# Patient Record
Sex: MALE | Race: BLACK OR AFRICAN AMERICAN | NOT HISPANIC OR LATINO | Employment: OTHER | URBAN - METROPOLITAN AREA
[De-identification: names, ages, dates, MRNs, and addresses within clinical notes are randomized per-mention and may not be internally consistent; named-entity substitution may affect disease eponyms.]

---

## 2022-05-06 RX ORDER — INSULIN GLARGINE 100 [IU]/ML
INJECTION, SOLUTION SUBCUTANEOUS
Status: ON HOLD | COMMUNITY
Start: 2022-01-13 | End: 2023-11-08 | Stop reason: SDUPTHER

## 2022-05-06 RX ORDER — FLUOXETINE HYDROCHLORIDE 20 MG/1
CAPSULE ORAL
Status: ON HOLD | COMMUNITY
Start: 2022-01-18 | End: 2023-11-19 | Stop reason: HOSPADM

## 2022-05-06 RX ORDER — TRAZODONE HYDROCHLORIDE 50 MG/1
100 TABLET ORAL NIGHTLY
COMMUNITY
Start: 2022-04-19

## 2022-05-06 RX ORDER — INSULIN ASPART 100 [IU]/ML
INJECTION, SOLUTION INTRAVENOUS; SUBCUTANEOUS
Status: ON HOLD | COMMUNITY
Start: 2022-04-25 | End: 2023-11-19 | Stop reason: SDUPTHER

## 2022-05-06 RX ORDER — CEPHALEXIN 500 MG/1
CAPSULE ORAL
COMMUNITY
Start: 2022-03-29 | End: 2023-11-06

## 2022-05-06 RX ORDER — BRIMONIDINE TARTRATE, TIMOLOL MALEATE 2; 5 MG/ML; MG/ML
SOLUTION/ DROPS OPHTHALMIC
Status: ON HOLD | COMMUNITY
Start: 2022-02-27 | End: 2023-11-19 | Stop reason: HOSPADM

## 2022-05-06 RX ORDER — METFORMIN HYDROCHLORIDE 1000 MG/1
1000 TABLET ORAL 2 TIMES DAILY WITH MEALS
Status: ON HOLD | COMMUNITY
Start: 2022-04-19 | End: 2023-11-19 | Stop reason: HOSPADM

## 2022-05-06 RX ORDER — SEMAGLUTIDE 1.34 MG/ML
INJECTION, SOLUTION SUBCUTANEOUS
Status: ON HOLD | COMMUNITY
Start: 2022-03-29 | End: 2023-11-19 | Stop reason: HOSPADM

## 2022-05-06 RX ORDER — CARVEDILOL 6.25 MG/1
6.25 TABLET ORAL 2 TIMES DAILY WITH MEALS
Status: ON HOLD | COMMUNITY
Start: 2021-11-10 | End: 2023-11-19 | Stop reason: HOSPADM

## 2022-05-06 RX ORDER — DORZOLAMIDE HCL 20 MG/ML
SOLUTION/ DROPS OPHTHALMIC
COMMUNITY
Start: 2022-04-13

## 2022-05-06 RX ORDER — ROSUVASTATIN CALCIUM 40 MG/1
40 TABLET, COATED ORAL DAILY
COMMUNITY
Start: 2022-04-19

## 2022-05-06 RX ORDER — LATANOPROST 50 UG/ML
SOLUTION/ DROPS OPHTHALMIC
COMMUNITY
Start: 2022-05-02

## 2022-05-06 RX ORDER — GLYCOPYRROLATE 1 MG/1
1 TABLET ORAL 2 TIMES DAILY
Status: ON HOLD | COMMUNITY
Start: 2022-01-22 | End: 2023-11-19 | Stop reason: HOSPADM

## 2022-05-06 RX ORDER — LISINOPRIL 40 MG/1
40 TABLET ORAL DAILY
Status: ON HOLD | COMMUNITY
Start: 2022-04-19 | End: 2023-11-19 | Stop reason: HOSPADM

## 2022-05-06 RX ORDER — AMLODIPINE BESYLATE 10 MG/1
10 TABLET ORAL DAILY
Status: ON HOLD | COMMUNITY
Start: 2022-04-13 | End: 2023-11-06

## 2022-05-06 NOTE — TELEPHONE ENCOUNTER
Unable to verify patient pharmacy, called patient, no answer, left message for patient to call clinic to verify pharmacy information

## 2023-11-06 ENCOUNTER — HOSPITAL ENCOUNTER (OUTPATIENT)
Facility: OTHER | Age: 58
Discharge: HOME | End: 2023-11-08
Attending: EMERGENCY MEDICINE | Admitting: INTERNAL MEDICINE
Payer: COMMERCIAL

## 2023-11-06 DIAGNOSIS — R73.9 HYPERGLYCEMIA: ICD-10-CM

## 2023-11-06 DIAGNOSIS — E11.65 TYPE 2 DIABETES MELLITUS WITH HYPERGLYCEMIA, WITH LONG-TERM CURRENT USE OF INSULIN: ICD-10-CM

## 2023-11-06 DIAGNOSIS — Z79.4 TYPE 2 DIABETES MELLITUS WITH HYPERGLYCEMIA, WITH LONG-TERM CURRENT USE OF INSULIN: ICD-10-CM

## 2023-11-06 DIAGNOSIS — N17.9 ACUTE KIDNEY INJURY: Primary | ICD-10-CM

## 2023-11-06 DIAGNOSIS — R07.9 CHEST PAIN: ICD-10-CM

## 2023-11-06 DIAGNOSIS — R06.02 SOB (SHORTNESS OF BREATH): ICD-10-CM

## 2023-11-06 PROBLEM — F32.A DEPRESSION: Status: ACTIVE | Noted: 2023-07-05

## 2023-11-06 PROBLEM — F43.23 ADJUSTMENT DISORDER WITH MIXED ANXIETY AND DEPRESSED MOOD: Status: ACTIVE | Noted: 2018-12-13

## 2023-11-06 PROBLEM — E11.42 DIABETIC PERIPHERAL NEUROPATHY ASSOCIATED WITH TYPE 2 DIABETES MELLITUS: Status: ACTIVE | Noted: 2022-06-06

## 2023-11-06 PROBLEM — Z89.511 ACQUIRED ABSENCE OF RIGHT LEG BELOW KNEE: Status: ACTIVE | Noted: 2022-01-13

## 2023-11-06 PROBLEM — E11.3292 MILD NONPROLIFERATIVE DIABETIC RETINOPATHY OF LEFT EYE WITHOUT MACULAR EDEMA ASSOCIATED WITH TYPE 2 DIABETES MELLITUS: Status: ACTIVE | Noted: 2021-12-17

## 2023-11-06 PROBLEM — I50.42 CHRONIC COMBINED SYSTOLIC AND DIASTOLIC HEART FAILURE: Status: ACTIVE | Noted: 2023-11-06

## 2023-11-06 PROBLEM — D50.9 MICROCYTIC ANEMIA: Status: ACTIVE | Noted: 2023-07-05

## 2023-11-06 PROBLEM — D50.0 IRON DEFICIENCY ANEMIA DUE TO CHRONIC BLOOD LOSS: Status: ACTIVE | Noted: 2023-11-06

## 2023-11-06 PROBLEM — F33.0 MAJOR DEPRESSIVE DISORDER, RECURRENT EPISODE, MILD: Status: ACTIVE | Noted: 2023-04-04

## 2023-11-06 LAB
ALBUMIN SERPL BCP-MCNC: 3 G/DL (ref 3.5–5.2)
ALP SERPL-CCNC: 97 U/L (ref 55–135)
ALT SERPL W/O P-5'-P-CCNC: 27 U/L (ref 10–44)
ANION GAP SERPL CALC-SCNC: 13 MMOL/L (ref 8–16)
AST SERPL-CCNC: 12 U/L (ref 10–40)
B-OH-BUTYR BLD STRIP-SCNC: 0.2 MMOL/L (ref 0–0.5)
BACTERIA #/AREA URNS HPF: ABNORMAL /HPF
BASOPHILS # BLD AUTO: 0.02 K/UL (ref 0–0.2)
BASOPHILS NFR BLD: 0.2 % (ref 0–1.9)
BILIRUB SERPL-MCNC: 0.4 MG/DL (ref 0.1–1)
BILIRUB UR QL STRIP: NEGATIVE
BUN SERPL-MCNC: 39 MG/DL (ref 6–20)
CALCIUM SERPL-MCNC: 9.7 MG/DL (ref 8.7–10.5)
CHLORIDE SERPL-SCNC: 98 MMOL/L (ref 95–110)
CLARITY UR: CLEAR
CO2 SERPL-SCNC: 19 MMOL/L (ref 23–29)
COLOR UR: YELLOW
CREAT SERPL-MCNC: 2.2 MG/DL (ref 0.5–1.4)
DIFFERENTIAL METHOD: ABNORMAL
EOSINOPHIL # BLD AUTO: 0 K/UL (ref 0–0.5)
EOSINOPHIL NFR BLD: 0.2 % (ref 0–8)
ERYTHROCYTE [DISTWIDTH] IN BLOOD BY AUTOMATED COUNT: 21.2 % (ref 11.5–14.5)
EST. GFR  (NO RACE VARIABLE): 34 ML/MIN/1.73 M^2
FIO2: 21
GLUCOSE SERPL-MCNC: 642 MG/DL (ref 70–110)
GLUCOSE UR QL STRIP: ABNORMAL
HCO3 UR-SCNC: 20.8 MMOL/L (ref 24–28)
HCT VFR BLD AUTO: 33.2 % (ref 40–54)
HCT VFR BLD CALC: 33 %PCV (ref 36–54)
HGB BLD-MCNC: 10.6 G/DL (ref 14–18)
HGB BLD-MCNC: 11 G/DL
HGB UR QL STRIP: NEGATIVE
IMM GRANULOCYTES # BLD AUTO: 0.13 K/UL (ref 0–0.04)
IMM GRANULOCYTES NFR BLD AUTO: 1.3 % (ref 0–0.5)
KETONES UR QL STRIP: NEGATIVE
LEUKOCYTE ESTERASE UR QL STRIP: ABNORMAL
LYMPHOCYTES # BLD AUTO: 1.2 K/UL (ref 1–4.8)
LYMPHOCYTES NFR BLD: 12.6 % (ref 18–48)
MCH RBC QN AUTO: 24.7 PG (ref 27–31)
MCHC RBC AUTO-ENTMCNC: 31.9 G/DL (ref 32–36)
MCV RBC AUTO: 77 FL (ref 82–98)
MICROSCOPIC COMMENT: ABNORMAL
MONOCYTES # BLD AUTO: 1 K/UL (ref 0.3–1)
MONOCYTES NFR BLD: 9.7 % (ref 4–15)
NEUTROPHILS # BLD AUTO: 7.5 K/UL (ref 1.8–7.7)
NEUTROPHILS NFR BLD: 76 % (ref 38–73)
NITRITE UR QL STRIP: NEGATIVE
NRBC BLD-RTO: 0 /100 WBC
PCO2 BLDA: 39.3 MMHG (ref 35–45)
PH SMN: 7.33 [PH] (ref 7.35–7.45)
PH UR STRIP: 6 [PH] (ref 5–8)
PLATELET # BLD AUTO: 261 K/UL (ref 150–450)
PMV BLD AUTO: 9.8 FL (ref 9.2–12.9)
PO2 BLDA: 23 MMHG (ref 40–60)
POC BE: -5 MMOL/L
POC SATURATED O2: 35 % (ref 95–100)
POC TCO2: 22 MMOL/L (ref 24–29)
POCT GLUCOSE: 278 MG/DL (ref 70–110)
POCT GLUCOSE: 301 MG/DL (ref 70–110)
POCT GLUCOSE: >500 MG/DL (ref 70–110)
POCT GLUCOSE: >500 MG/DL (ref 70–110)
POTASSIUM SERPL-SCNC: 5 MMOL/L (ref 3.5–5.1)
PROT SERPL-MCNC: 7.3 G/DL (ref 6–8.4)
PROT UR QL STRIP: ABNORMAL
RBC # BLD AUTO: 4.3 M/UL (ref 4.6–6.2)
RBC #/AREA URNS HPF: 1 /HPF (ref 0–4)
SAMPLE: ABNORMAL
SODIUM SERPL-SCNC: 130 MMOL/L (ref 136–145)
SP GR UR STRIP: 1.02 (ref 1–1.03)
SQUAMOUS #/AREA URNS HPF: 0 /HPF
URN SPEC COLLECT METH UR: ABNORMAL
UROBILINOGEN UR STRIP-ACNC: NEGATIVE EU/DL
WBC # BLD AUTO: 9.87 K/UL (ref 3.9–12.7)
WBC #/AREA URNS HPF: 43 /HPF (ref 0–5)
YEAST URNS QL MICRO: ABNORMAL

## 2023-11-06 PROCEDURE — 93010 ELECTROCARDIOGRAM REPORT: CPT | Mod: ,,, | Performed by: INTERNAL MEDICINE

## 2023-11-06 PROCEDURE — 82803 BLOOD GASES ANY COMBINATION: CPT

## 2023-11-06 PROCEDURE — 99900035 HC TECH TIME PER 15 MIN (STAT)

## 2023-11-06 PROCEDURE — G0378 HOSPITAL OBSERVATION PER HR: HCPCS

## 2023-11-06 PROCEDURE — 96372 THER/PROPH/DIAG INJ SC/IM: CPT | Performed by: PHYSICIAN ASSISTANT

## 2023-11-06 PROCEDURE — 82010 KETONE BODYS QUAN: CPT | Performed by: EMERGENCY MEDICINE

## 2023-11-06 PROCEDURE — 25000003 PHARM REV CODE 250: Performed by: EMERGENCY MEDICINE

## 2023-11-06 PROCEDURE — 87077 CULTURE AEROBIC IDENTIFY: CPT | Performed by: NURSE PRACTITIONER

## 2023-11-06 PROCEDURE — 82962 GLUCOSE BLOOD TEST: CPT

## 2023-11-06 PROCEDURE — 87086 URINE CULTURE/COLONY COUNT: CPT | Performed by: NURSE PRACTITIONER

## 2023-11-06 PROCEDURE — 96374 THER/PROPH/DIAG INJ IV PUSH: CPT

## 2023-11-06 PROCEDURE — 87186 SC STD MICRODIL/AGAR DIL: CPT | Performed by: NURSE PRACTITIONER

## 2023-11-06 PROCEDURE — 85025 COMPLETE CBC W/AUTO DIFF WBC: CPT | Performed by: NURSE PRACTITIONER

## 2023-11-06 PROCEDURE — 93010 EKG 12-LEAD: ICD-10-PCS | Mod: ,,, | Performed by: INTERNAL MEDICINE

## 2023-11-06 PROCEDURE — 80053 COMPREHEN METABOLIC PANEL: CPT | Performed by: NURSE PRACTITIONER

## 2023-11-06 PROCEDURE — 63600175 PHARM REV CODE 636 W HCPCS: Performed by: EMERGENCY MEDICINE

## 2023-11-06 PROCEDURE — 87088 URINE BACTERIA CULTURE: CPT | Performed by: NURSE PRACTITIONER

## 2023-11-06 PROCEDURE — 93005 ELECTROCARDIOGRAM TRACING: CPT

## 2023-11-06 PROCEDURE — 81000 URINALYSIS NONAUTO W/SCOPE: CPT | Performed by: NURSE PRACTITIONER

## 2023-11-06 PROCEDURE — 25000003 PHARM REV CODE 250: Performed by: PHYSICIAN ASSISTANT

## 2023-11-06 PROCEDURE — 99285 EMERGENCY DEPT VISIT HI MDM: CPT | Mod: 25

## 2023-11-06 RX ORDER — IPRATROPIUM BROMIDE AND ALBUTEROL SULFATE 2.5; .5 MG/3ML; MG/3ML
3 SOLUTION RESPIRATORY (INHALATION) EVERY 4 HOURS PRN
Status: DISCONTINUED | OUTPATIENT
Start: 2023-11-06 | End: 2023-11-08 | Stop reason: HOSPADM

## 2023-11-06 RX ORDER — NALOXONE HCL 0.4 MG/ML
0.02 VIAL (ML) INJECTION
Status: DISCONTINUED | OUTPATIENT
Start: 2023-11-06 | End: 2023-11-08 | Stop reason: HOSPADM

## 2023-11-06 RX ORDER — SPIRONOLACTONE 25 MG/1
25 TABLET ORAL DAILY
Status: ON HOLD | COMMUNITY
Start: 2023-10-30 | End: 2023-11-19 | Stop reason: HOSPADM

## 2023-11-06 RX ORDER — BLOOD-GLUCOSE CONTROL, NORMAL
1 EACH MISCELLANEOUS 3 TIMES DAILY PRN
Status: ON HOLD | COMMUNITY
Start: 2023-07-19 | End: 2023-11-19 | Stop reason: HOSPADM

## 2023-11-06 RX ORDER — IBUPROFEN 200 MG
16 TABLET ORAL
Status: DISCONTINUED | OUTPATIENT
Start: 2023-11-06 | End: 2023-11-08 | Stop reason: HOSPADM

## 2023-11-06 RX ORDER — SUCRALFATE 1 G/10ML
1 SUSPENSION ORAL EVERY 6 HOURS
Status: DISCONTINUED | OUTPATIENT
Start: 2023-11-07 | End: 2023-11-08 | Stop reason: HOSPADM

## 2023-11-06 RX ORDER — ATORVASTATIN CALCIUM 20 MG/1
80 TABLET, FILM COATED ORAL NIGHTLY
Status: DISCONTINUED | OUTPATIENT
Start: 2023-11-06 | End: 2023-11-08 | Stop reason: HOSPADM

## 2023-11-06 RX ORDER — MAG HYDROX/ALUMINUM HYD/SIMETH 200-200-20
30 SUSPENSION, ORAL (FINAL DOSE FORM) ORAL 4 TIMES DAILY PRN
Status: DISCONTINUED | OUTPATIENT
Start: 2023-11-06 | End: 2023-11-08 | Stop reason: HOSPADM

## 2023-11-06 RX ORDER — BISACODYL 10 MG
10 SUPPOSITORY, RECTAL RECTAL DAILY PRN
Status: DISCONTINUED | OUTPATIENT
Start: 2023-11-06 | End: 2023-11-08 | Stop reason: HOSPADM

## 2023-11-06 RX ORDER — TALC
6 POWDER (GRAM) TOPICAL NIGHTLY PRN
Status: DISCONTINUED | OUTPATIENT
Start: 2023-11-06 | End: 2023-11-08 | Stop reason: HOSPADM

## 2023-11-06 RX ORDER — SODIUM CHLORIDE 9 MG/ML
1000 INJECTION, SOLUTION INTRAVENOUS
Status: COMPLETED | OUTPATIENT
Start: 2023-11-06 | End: 2023-11-06

## 2023-11-06 RX ORDER — ONDANSETRON 8 MG/1
8 TABLET, ORALLY DISINTEGRATING ORAL EVERY 8 HOURS PRN
Status: DISCONTINUED | OUTPATIENT
Start: 2023-11-06 | End: 2023-11-08 | Stop reason: HOSPADM

## 2023-11-06 RX ORDER — PANTOPRAZOLE SODIUM 40 MG/1
40 TABLET, DELAYED RELEASE ORAL EVERY MORNING
COMMUNITY
Start: 2023-08-03

## 2023-11-06 RX ORDER — VALSARTAN 40 MG/1
40 TABLET ORAL 2 TIMES DAILY
Status: ON HOLD | COMMUNITY
Start: 2023-07-25 | End: 2023-11-08 | Stop reason: HOSPADM

## 2023-11-06 RX ORDER — LANOLIN ALCOHOL/MO/W.PET/CERES
1 CREAM (GRAM) TOPICAL DAILY
Status: DISCONTINUED | OUTPATIENT
Start: 2023-11-07 | End: 2023-11-08 | Stop reason: HOSPADM

## 2023-11-06 RX ORDER — FERROUS SULFATE TAB 325 MG (65 MG ELEMENTAL FE) 325 (65 FE) MG
TAB ORAL EVERY OTHER DAY
COMMUNITY
Start: 2023-08-03

## 2023-11-06 RX ORDER — MAG HYDROX/ALUMINUM HYD/SIMETH 200-200-20
30 SUSPENSION, ORAL (FINAL DOSE FORM) ORAL
Status: DISCONTINUED | OUTPATIENT
Start: 2023-11-06 | End: 2023-11-08 | Stop reason: HOSPADM

## 2023-11-06 RX ORDER — MIRTAZAPINE 15 MG/1
15 TABLET, FILM COATED ORAL NIGHTLY
COMMUNITY
Start: 2023-09-16

## 2023-11-06 RX ORDER — PROCHLORPERAZINE EDISYLATE 5 MG/ML
5 INJECTION INTRAMUSCULAR; INTRAVENOUS EVERY 6 HOURS PRN
Status: DISCONTINUED | OUTPATIENT
Start: 2023-11-06 | End: 2023-11-08 | Stop reason: HOSPADM

## 2023-11-06 RX ORDER — EMPAGLIFLOZIN 10 MG/1
10 TABLET, FILM COATED ORAL DAILY
Status: ON HOLD | COMMUNITY
Start: 2023-10-23 | End: 2023-11-19 | Stop reason: HOSPADM

## 2023-11-06 RX ORDER — SENNOSIDES 8.6 MG/1
1 TABLET ORAL DAILY
Status: DISCONTINUED | OUTPATIENT
Start: 2023-11-07 | End: 2023-11-08 | Stop reason: HOSPADM

## 2023-11-06 RX ORDER — MIRTAZAPINE 15 MG/1
15 TABLET, FILM COATED ORAL NIGHTLY
Status: DISCONTINUED | OUTPATIENT
Start: 2023-11-06 | End: 2023-11-08 | Stop reason: HOSPADM

## 2023-11-06 RX ORDER — SODIUM CHLORIDE 0.9 % (FLUSH) 0.9 %
5 SYRINGE (ML) INJECTION
Status: DISCONTINUED | OUTPATIENT
Start: 2023-11-06 | End: 2023-11-08 | Stop reason: HOSPADM

## 2023-11-06 RX ORDER — TRAZODONE HYDROCHLORIDE 100 MG/1
100 TABLET ORAL NIGHTLY
Status: DISCONTINUED | OUTPATIENT
Start: 2023-11-06 | End: 2023-11-08 | Stop reason: HOSPADM

## 2023-11-06 RX ORDER — CARVEDILOL 6.25 MG/1
6.25 TABLET ORAL 2 TIMES DAILY WITH MEALS
Status: DISCONTINUED | OUTPATIENT
Start: 2023-11-07 | End: 2023-11-08 | Stop reason: HOSPADM

## 2023-11-06 RX ORDER — ACETAMINOPHEN 500 MG
1000 TABLET ORAL EVERY 8 HOURS PRN
Status: DISCONTINUED | OUTPATIENT
Start: 2023-11-06 | End: 2023-11-08 | Stop reason: HOSPADM

## 2023-11-06 RX ORDER — FUROSEMIDE 40 MG/1
40 TABLET ORAL DAILY
Status: ON HOLD | COMMUNITY
Start: 2023-10-25 | End: 2023-11-19 | Stop reason: HOSPADM

## 2023-11-06 RX ORDER — GLUCAGON 1 MG
1 KIT INJECTION
Status: DISCONTINUED | OUTPATIENT
Start: 2023-11-06 | End: 2023-11-08 | Stop reason: HOSPADM

## 2023-11-06 RX ORDER — IBUPROFEN 200 MG
24 TABLET ORAL
Status: DISCONTINUED | OUTPATIENT
Start: 2023-11-06 | End: 2023-11-08 | Stop reason: HOSPADM

## 2023-11-06 RX ORDER — SENNOSIDES 8.6 MG/1
1 TABLET ORAL DAILY
COMMUNITY
Start: 2023-04-10 | End: 2024-04-09

## 2023-11-06 RX ORDER — SIMETHICONE 80 MG
1 TABLET,CHEWABLE ORAL 4 TIMES DAILY PRN
Status: DISCONTINUED | OUTPATIENT
Start: 2023-11-06 | End: 2023-11-08 | Stop reason: HOSPADM

## 2023-11-06 RX ORDER — ACETAMINOPHEN 325 MG/1
650 TABLET ORAL EVERY 4 HOURS PRN
Status: DISCONTINUED | OUTPATIENT
Start: 2023-11-06 | End: 2023-11-08 | Stop reason: HOSPADM

## 2023-11-06 RX ORDER — LATANOPROST 50 UG/ML
1 SOLUTION/ DROPS OPHTHALMIC NIGHTLY
Status: DISCONTINUED | OUTPATIENT
Start: 2023-11-06 | End: 2023-11-08 | Stop reason: HOSPADM

## 2023-11-06 RX ORDER — ASPIRIN 81 MG/1
81 TABLET ORAL DAILY
Status: DISCONTINUED | OUTPATIENT
Start: 2023-11-07 | End: 2023-11-08 | Stop reason: HOSPADM

## 2023-11-06 RX ORDER — INSULIN PUMP SYRINGE, 3 ML
EACH MISCELLANEOUS
Status: ON HOLD | COMMUNITY
Start: 2023-07-19 | End: 2023-11-19 | Stop reason: HOSPADM

## 2023-11-06 RX ORDER — VENLAFAXINE HYDROCHLORIDE 75 MG/1
150 CAPSULE, EXTENDED RELEASE ORAL DAILY
Status: DISCONTINUED | OUTPATIENT
Start: 2023-11-07 | End: 2023-11-08 | Stop reason: HOSPADM

## 2023-11-06 RX ORDER — VENLAFAXINE HYDROCHLORIDE 150 MG/1
150 CAPSULE, EXTENDED RELEASE ORAL DAILY
COMMUNITY
Start: 2023-10-12

## 2023-11-06 RX ORDER — ASPIRIN 81 MG/1
1 TABLET ORAL DAILY
COMMUNITY
Start: 2023-10-30 | End: 2024-01-28

## 2023-11-06 RX ORDER — INSULIN ASPART 100 [IU]/ML
5 INJECTION, SOLUTION INTRAVENOUS; SUBCUTANEOUS
Status: DISCONTINUED | OUTPATIENT
Start: 2023-11-07 | End: 2023-11-07

## 2023-11-06 RX ADMIN — LATANOPROST 1 DROP: 50 SOLUTION OPHTHALMIC at 09:11

## 2023-11-06 RX ADMIN — TRAZODONE HYDROCHLORIDE 100 MG: 100 TABLET ORAL at 10:11

## 2023-11-06 RX ADMIN — SODIUM CHLORIDE 1000 ML: 0.9 INJECTION, SOLUTION INTRAVENOUS at 05:11

## 2023-11-06 RX ADMIN — INSULIN HUMAN 6 UNITS: 100 INJECTION, SOLUTION PARENTERAL at 06:11

## 2023-11-06 RX ADMIN — MIRTAZAPINE 15 MG: 15 TABLET, FILM COATED ORAL at 09:11

## 2023-11-06 RX ADMIN — ALUMINUM HYDROXIDE, MAGNESIUM HYDROXIDE, AND SIMETHICONE 30 ML: 1200; 120; 1200 SUSPENSION ORAL at 09:11

## 2023-11-06 RX ADMIN — ATORVASTATIN CALCIUM 80 MG: 20 TABLET, FILM COATED ORAL at 09:11

## 2023-11-06 RX ADMIN — INSULIN DETEMIR 12 UNITS: 100 INJECTION, SOLUTION SUBCUTANEOUS at 10:11

## 2023-11-06 NOTE — FIRST PROVIDER EVALUATION
Emergency Department TeleTriage Encounter Note      CHIEF COMPLAINT    Chief Complaint   Patient presents with    Shortness of Breath     SOB for the past two weeks with generalized weakness especially when standing up. Pt states that these s/s have really been going on for over a year. He says he has been seen by [facility] and [facility] for the same s/s. And was told nothing is wrong.        VITAL SIGNS   Initial Vitals [11/06/23 1515]   BP Pulse Resp Temp SpO2   128/74 94 18 98.4 °F (36.9 °C) 100 %      MAP       --            ALLERGIES    Review of patient's allergies indicates:  No Known Allergies    PROVIDER TRIAGE NOTE  Verbal consent for the teletriage evaluation was given by the patient at the start of the evaluation.  All efforts will be made to maintain patient's privacy during the evaluation.      This is a teletriage evaluation of a 58 y.o. male presenting to the ED with c/o states he has been passing out since last year.  Has seen Neurology and was told that nothing is wrong.  Also reports SOB and fatigue. Limited physical exam via telehealth: The patient is awake, alert, answering questions appropriately and is not in respiratory distress.  As the Teletriage provider, I performed an initial assessment and ordered appropriate labs and imaging studies, if any, to facilitate the patient's care once placed in the ED. Once a room is available, care and a full evaluation will be completed by an alternate ED provider.  Any additional orders and the final disposition will be determined by that provider.  All imaging and labs will not be followed-up by the Teletriage Team, including myself.          ORDERS  Labs Reviewed - No data to display    ED Orders (720h ago, onward)      Start Ordered     Status Ordering Provider    11/06/23 1541 11/06/23 1541  Saline lock IV  Once         Ordered LALIT RENO    11/06/23 1541 11/06/23 1541  Orthostatic blood pressure  Once         Ordered LALIT RENO    11/06/23 1541 11/06/23  1541  Pulse Oximetry Continuous  Continuous         Ordered LALIT RENO    11/06/23 1541 11/06/23 1541  Cardiac Monitoring - Adult  Continuous        Comments: Notify Physician If:    Ordered LALIT RENO    11/06/23 1541 11/06/23 1541  CBC auto differential  STAT         Ordered LALIT RENO    11/06/23 1541 11/06/23 1541  Comprehensive metabolic panel  STAT         Ordered LALIT RENO    11/06/23 1541 11/06/23 1541  EKG 12-lead  Once         Ordered LALIT RENO    11/06/23 1541 11/06/23 1541  POCT glucose  Once         Ordered LALIT RENO    11/06/23 1541 11/06/23 1541  Urinalysis, Reflex to Urine Culture Urine, Clean Catch  STAT         Ordered LALIT RENO    11/06/23 1541 11/06/23 1541  X-Ray Chest PA And Lateral  1 time imaging         Ordered LALIT RENO              Virtual Visit Note: The provider triage portion of this emergency department evaluation and documentation was performed via [employer], a HIPAA-compliant telemedicine application, in concert with a tele-presenter in the room. A face to face patient evaluation with one of my colleagues will occur once the patient is placed in an emergency department room.      DISCLAIMER: This note was prepared with [employer] voice recognition transcription software. Garbled syntax, mangled pronouns, and other bizarre constructions may be attributed to that software system.

## 2023-11-06 NOTE — ED TRIAGE NOTES
"Pt arrived with c/o lightheadedness when changing positions.  Pt states he has been having issues with this for about a year, but it got worse this weekend.  Pt also c/o SOB "only when bending over," states breathing normalized in 5 minutes.  Pt reports n/v x 2 days.  Pt is diabetic, states BS has been reading elevated for the past several weeks, pt is compliant with meds.  Pt denies abd pain or fever.  Pt answering questions appropriately, speaking in complete sentences, respirations even and unlabored.  Aao x 4.    "

## 2023-11-06 NOTE — ED PROVIDER NOTES
Source of History:  The patient as well as his significant other who is at bedside    Chief complaint:  Shortness of Breath (SOB for the past two weeks with generalized weakness especially when standing up. Pt states that these s/s have really been going on for over a year. He says he has been seen by [facility] and [facility] for the same s/s. And was told nothing is wrong. )      HPI:  Chi Mckeon is a 58 y.o. male with history of heart failure as well as uncontrolled diabetes presenting with complaint of generalized weakness that he has been dealing with over a year but currently last couple of days.  He has been to multiple emergency departments including [facility] in [facility] for the same symptoms.  His doctors are primarily at [facility] and [facility].  They state they just tell him nothing is wrong.  States he has intermittent generalized weakness especially when standing up., shortness of breath or chest pain.  States he is always drinking water including Gatorade.    This is the extent to the patients complaints today here in the emergency department.    ROS:   See HPI.    Review of patient's allergies indicates:  No Known Allergies    PMH:  As per HPI and below:  Past Medical History:   Diagnosis Date    CHF (congestive heart failure)     Diabetes mellitus     type 2    Heart failure, unspecified     Hypertension     Unspecified glaucoma      Past Surgical History:   Procedure Laterality Date    AMPUTATION Right     below the knee of r side.  also amputation of L toe            Physical Exam:    BP (!) 115/58 (BP Location: Right arm, Patient Position: Lying)   Pulse 83   Temp 98.4 °F (36.9 °C) (Oral)   Resp 16   Wt 105.2 kg (232 lb)   SpO2 98%   Nursing note and vital signs reviewed.  Constitutional: No acute distress.  Nontoxic  Cardiovascular: Regular rate and rhythm.  No murmurs. No gallops. No rubs  Respiratory: Clear to auscultation bilaterally.  Good air  movement.  No wheezes.  No rhonchi. No rales. No accessory muscle use..  Abdomen: Soft.  Not distended.  Nontender.  No guarding.  No rebound. Non-peritoneal.  Musculoskeletal: Good range of motion all joints.  No deformities.  Neck supple.  No meningismus.  Extremities: No pitting edema  Neuro: alert. At baseline.    Summary of Previous Medical Records:  Reviewed outpatient facility previous discharge summary from July 2023 which was admitted for acute respiratory failure and hypoxia in the setting of acute on chronic systolic heart failure.  A1c in April 2023 was 10.6.  Elevated blood pressure was controlled with diuresis as well as home medications.    MDM/ Differential Dx:    58-year-old male with multiple comorbidities feeling bad for the last 2 days.  States it has been intermittent over last year and has had multiple workups in the past including seeing primary care doctors and emergency department doctors.  Denies any respiratory symptoms to suggest CHF or pulmonary edema.  Lower suspicion for acute coronary syndrome.  Possible viral etiology as we have seen influenza however his vital signs are normal here so less likely.  No significant edema in lower extremities.  His sugars are greater than 500 here so possibility related to hyperglycemia including metabolic derangement acute kidney injury which will check blood work.  No evidence to suggest pneumonia.      ED Course as of 11/06/23 1905   Mon Nov 06, 2023   1639 X-Ray Chest PA And Lateral  Chest x-ray independently interpreted by myself shows normal cardiac size, no infiltrate or focal consolidation, no pneumothorax, no bony abnormalities.  Overall impression negative chest x-ray. [SM]   1641 POCT Glucose(!!): >500 [SM]   1641 EKG 12-lead  EKG independently interpreted by myself shows normal sinus rhythm at a rate of 87, normal intervals, narrow QRS, no ST T wave abnormalities.  Large Q-wave in lead 3 and AVF.  No previous EKG to compare to.  Overall  impression Q-wave in lead 3 and AVF that is nonspecific.  No STEMI. [SM]   1724 POC PH(!): 7.332 [SM]   1724 Beta-Hydroxybutyrate: 0.2 [SM]   1724 WBC: 9.87 [SM]   1724 Hemoglobin(!): 10.6 [SM]   1724 Platelet Count: 261 [SM]   1816 Ketones, UA: Negative [SM]   1816 Creatinine(!): 2.2  Consistent with ROXANNE. [SM]   1818 Anion Gap: 13 []   1903 Spoke with Hospital Medicine will admit to Dr. Garcia. []   1903 Although no definitive DKA at this time his pH is 7.33 with a glucose of 650 so I feel he needs to be brought in to prevent going into DKA or HHS. []   1904 Discussed with the patient as well as his wife and they agree with the plan of care. []      ED Course User Index  [] Pablo Simeon,                  Diagnostic Impression:    1. Acute kidney injury    2. SOB (shortness of breath)    3. Hyperglycemia         ED Disposition Condition    Observation Stable                  Pablo Simeon,   11/06/23 1905

## 2023-11-06 NOTE — ED NOTES
Pt unable to stand due to dizziness. Instructed pt to sit down. Unable to obtain standing orthostatics VS.

## 2023-11-07 LAB
ALBUMIN SERPL BCP-MCNC: 2.7 G/DL (ref 3.5–5.2)
ALP SERPL-CCNC: 93 U/L (ref 55–135)
ALT SERPL W/O P-5'-P-CCNC: 25 U/L (ref 10–44)
ANION GAP SERPL CALC-SCNC: 9 MMOL/L (ref 8–16)
AST SERPL-CCNC: 14 U/L (ref 10–40)
BASOPHILS # BLD AUTO: 0.02 K/UL (ref 0–0.2)
BASOPHILS NFR BLD: 0.3 % (ref 0–1.9)
BILIRUB SERPL-MCNC: 0.3 MG/DL (ref 0.1–1)
BUN SERPL-MCNC: 35 MG/DL (ref 6–20)
CALCIUM SERPL-MCNC: 9.7 MG/DL (ref 8.7–10.5)
CHLORIDE SERPL-SCNC: 105 MMOL/L (ref 95–110)
CO2 SERPL-SCNC: 22 MMOL/L (ref 23–29)
CREAT SERPL-MCNC: 1.6 MG/DL (ref 0.5–1.4)
DIFFERENTIAL METHOD: ABNORMAL
EOSINOPHIL # BLD AUTO: 0.1 K/UL (ref 0–0.5)
EOSINOPHIL NFR BLD: 0.8 % (ref 0–8)
ERYTHROCYTE [DISTWIDTH] IN BLOOD BY AUTOMATED COUNT: 21.1 % (ref 11.5–14.5)
EST. GFR  (NO RACE VARIABLE): 49 ML/MIN/1.73 M^2
GLUCOSE SERPL-MCNC: 397 MG/DL (ref 70–110)
HCT VFR BLD AUTO: 30 % (ref 40–54)
HGB BLD-MCNC: 9.5 G/DL (ref 14–18)
IMM GRANULOCYTES # BLD AUTO: 0.13 K/UL (ref 0–0.04)
IMM GRANULOCYTES NFR BLD AUTO: 1.8 % (ref 0–0.5)
LYMPHOCYTES # BLD AUTO: 1.7 K/UL (ref 1–4.8)
LYMPHOCYTES NFR BLD: 23.3 % (ref 18–48)
MCH RBC QN AUTO: 24.7 PG (ref 27–31)
MCHC RBC AUTO-ENTMCNC: 31.7 G/DL (ref 32–36)
MCV RBC AUTO: 78 FL (ref 82–98)
MONOCYTES # BLD AUTO: 1 K/UL (ref 0.3–1)
MONOCYTES NFR BLD: 14 % (ref 4–15)
NEUTROPHILS # BLD AUTO: 4.3 K/UL (ref 1.8–7.7)
NEUTROPHILS NFR BLD: 59.8 % (ref 38–73)
NRBC BLD-RTO: 0 /100 WBC
PLATELET # BLD AUTO: 263 K/UL (ref 150–450)
PMV BLD AUTO: 9.8 FL (ref 9.2–12.9)
POCT GLUCOSE: 330 MG/DL (ref 70–110)
POCT GLUCOSE: 335 MG/DL (ref 70–110)
POCT GLUCOSE: 336 MG/DL (ref 70–110)
POCT GLUCOSE: 378 MG/DL (ref 70–110)
POTASSIUM SERPL-SCNC: 4.7 MMOL/L (ref 3.5–5.1)
PROT SERPL-MCNC: 6.7 G/DL (ref 6–8.4)
RBC # BLD AUTO: 3.85 M/UL (ref 4.6–6.2)
SODIUM SERPL-SCNC: 136 MMOL/L (ref 136–145)
WBC # BLD AUTO: 7.26 K/UL (ref 3.9–12.7)

## 2023-11-07 PROCEDURE — G0378 HOSPITAL OBSERVATION PER HR: HCPCS

## 2023-11-07 PROCEDURE — 94761 N-INVAS EAR/PLS OXIMETRY MLT: CPT

## 2023-11-07 PROCEDURE — 96372 THER/PROPH/DIAG INJ SC/IM: CPT | Performed by: PHYSICIAN ASSISTANT

## 2023-11-07 PROCEDURE — 36415 COLL VENOUS BLD VENIPUNCTURE: CPT | Performed by: PHYSICIAN ASSISTANT

## 2023-11-07 PROCEDURE — 63600175 PHARM REV CODE 636 W HCPCS: Performed by: PHYSICIAN ASSISTANT

## 2023-11-07 PROCEDURE — 25000003 PHARM REV CODE 250: Performed by: PHYSICIAN ASSISTANT

## 2023-11-07 PROCEDURE — 80053 COMPREHEN METABOLIC PANEL: CPT | Performed by: PHYSICIAN ASSISTANT

## 2023-11-07 PROCEDURE — 85025 COMPLETE CBC W/AUTO DIFF WBC: CPT | Performed by: PHYSICIAN ASSISTANT

## 2023-11-07 RX ORDER — INSULIN ASPART 100 [IU]/ML
8 INJECTION, SOLUTION INTRAVENOUS; SUBCUTANEOUS
Status: DISCONTINUED | OUTPATIENT
Start: 2023-11-07 | End: 2023-11-08 | Stop reason: HOSPADM

## 2023-11-07 RX ADMIN — INSULIN DETEMIR 15 UNITS: 100 INJECTION, SOLUTION SUBCUTANEOUS at 08:11

## 2023-11-07 RX ADMIN — INSULIN DETEMIR 3 UNITS: 100 INJECTION, SOLUTION SUBCUTANEOUS at 04:11

## 2023-11-07 RX ADMIN — INSULIN ASPART 5 UNITS: 100 INJECTION, SOLUTION INTRAVENOUS; SUBCUTANEOUS at 12:11

## 2023-11-07 RX ADMIN — CARVEDILOL 6.25 MG: 6.25 TABLET, FILM COATED ORAL at 04:11

## 2023-11-07 RX ADMIN — INSULIN ASPART 5 UNITS: 100 INJECTION, SOLUTION INTRAVENOUS; SUBCUTANEOUS at 08:11

## 2023-11-07 RX ADMIN — VENLAFAXINE HYDROCHLORIDE 150 MG: 75 CAPSULE, EXTENDED RELEASE ORAL at 08:11

## 2023-11-07 RX ADMIN — ALUMINUM HYDROXIDE, MAGNESIUM HYDROXIDE, AND SIMETHICONE 30 ML: 1200; 120; 1200 SUSPENSION ORAL at 05:11

## 2023-11-07 RX ADMIN — FERROUS SULFATE TAB 325 MG (65 MG ELEMENTAL FE) 1 EACH: 325 (65 FE) TAB at 08:11

## 2023-11-07 RX ADMIN — ATORVASTATIN CALCIUM 80 MG: 20 TABLET, FILM COATED ORAL at 08:11

## 2023-11-07 RX ADMIN — MIRTAZAPINE 15 MG: 15 TABLET, FILM COATED ORAL at 08:11

## 2023-11-07 RX ADMIN — CARVEDILOL 6.25 MG: 6.25 TABLET, FILM COATED ORAL at 08:11

## 2023-11-07 RX ADMIN — ACETAMINOPHEN 1000 MG: 500 TABLET ORAL at 01:11

## 2023-11-07 RX ADMIN — LATANOPROST 1 DROP: 50 SOLUTION OPHTHALMIC at 08:11

## 2023-11-07 RX ADMIN — ALUMINUM HYDROXIDE, MAGNESIUM HYDROXIDE, AND SIMETHICONE 30 ML: 1200; 120; 1200 SUSPENSION ORAL at 08:11

## 2023-11-07 RX ADMIN — SUCRALFATE 1 G: 1 SUSPENSION ORAL at 11:11

## 2023-11-07 RX ADMIN — ASPIRIN 81 MG: 81 TABLET, COATED ORAL at 08:11

## 2023-11-07 RX ADMIN — INSULIN ASPART 8 UNITS: 100 INJECTION, SOLUTION INTRAVENOUS; SUBCUTANEOUS at 04:11

## 2023-11-07 RX ADMIN — ALUMINUM HYDROXIDE, MAGNESIUM HYDROXIDE, AND SIMETHICONE 30 ML: 1200; 120; 1200 SUSPENSION ORAL at 12:11

## 2023-11-07 RX ADMIN — INSULIN DETEMIR 12 UNITS: 100 INJECTION, SOLUTION SUBCUTANEOUS at 08:11

## 2023-11-07 RX ADMIN — ALUMINUM HYDROXIDE, MAGNESIUM HYDROXIDE, AND SIMETHICONE 30 ML: 1200; 120; 1200 SUSPENSION ORAL at 04:11

## 2023-11-07 RX ADMIN — TRAZODONE HYDROCHLORIDE 100 MG: 100 TABLET ORAL at 08:11

## 2023-11-07 NOTE — H&P
[facility] Surg [address] Medicine  History & Physical    Patient Name: Chi Mckeon  MRN: [id number]  Patient Class: OP- Observation  Admission Date: 11/6/2023  Attending Physician: Constantino Garcia MD   Primary Care Provider: [facility]         Patient information was obtained from patient, past medical records and ER records.     Subjective:     Principal Problem:Hyperglycemia due to diabetes mellitus    Chief Complaint:   Chief Complaint   Patient presents with    Shortness of Breath     SOB for the past two weeks with generalized weakness especially when standing up. Pt states that these s/s have really been going on for over a year. He says he has been seen by [facility] and [facility] for the same s/s. And was told nothing is wrong.         HPI: Chi Mckeon is a 58M with CHFrEF 35-40%, type 2 diabetes (last A1c 14.9 09/22/23) and obesity who presents for generalized weakness.  The majority of his care is from [facility] and [facility].  For the past year he says he feels weak and tired.  Recently he has been increasingly more thirsty and notices his sugars have been running higher.  At home he reports compliance with his diabetic regimen which includes 20 units of basal insulin twice a day and 10 units of NovoLog 3 times daily with meals. He cannot recall all of his medicines, but states that due to insurance he recently had to switch insulin brands and believes this is why his sugar has been running high. When asked about diet he says he tries not to eat when his sugars are high but will end up snacking on things like peanut butter if he gets hungry.     In the ER he is afebrile without leukocytosis, CBC shows a hemoglobin of 10.6 which is his baseline, CMP reveals a glucose of 642, creatinine 2.2, bicarb 19.  Beta hydroxybutyrate is within normal limits. pH is 7.33; chest x-ray unremarkable.  He was given 1 L of IV fluids and 6 units of IV insulin      Past Medical History:   Diagnosis Date    CHF  (congestive heart failure)     Diabetes mellitus     type 2    Heart failure, unspecified     Hypertension     Unspecified glaucoma        Past Surgical History:   Procedure Laterality Date    AMPUTATION Right     below the knee of r side.  also amputation of L toe       Review of patient's allergies indicates:  No Known Allergies    No current facility-administered medications on file prior to encounter.     Current Outpatient Medications on File Prior to Encounter   Medication Sig    aspirin (ECOTRIN) 81 MG EC tablet Take 1 tablet by mouth once daily.    blood sugar diagnostic Strp 10 strips by Other route 3 (three) times daily as needed.    blood sugar diagnostic Strp 1 strip. Test blood sugar three times daily    blood-glucose meter kit Use as instructed    lancets 30 gauge Misc 1 each by Other route 3 (three) times daily as needed.    senna (SENOKOT) 8.6 mg tablet Take 1 tablet by mouth once daily.    amLODIPine (NORVASC) 10 MG tablet Take 10 mg by mouth once daily.    carvediloL (COREG) 6.25 MG tablet Take 6.25 mg by mouth 2 (two) times daily with meals.    COMBIGAN 0.2-0.5 % Drop INSTILL 1 DROP INTO EACH EYE EVERY 12 HOURS    dorzolamide (TRUSOPT) 2 % ophthalmic solution INSTILL 1 DROPS INTO EACH EYE THREE TIMES DAILY    FEROSUL 325 mg (65 mg iron) Tab tablet Take by mouth every other day.    FLUoxetine 20 MG capsule TAKE 1 CAPSULE BY MOUTH ONCE DAILY FOR 21 DAYS    furosemide (LASIX) 40 MG tablet Take 40 mg by mouth once daily.    glycopyrrolate (ROBINUL) 1 mg Tab Take 1 mg by mouth 2 (two) times daily.    JARDIANCE 10 mg tablet Take 10 mg by mouth once daily.    LANTUS SOLOSTAR U-100 INSULIN glargine 100 units/mL (3mL) SubQ pen INJECT 30 UNITS SUBCUTANEOUSLY TWICE DAILY    latanoprost 0.005 % ophthalmic solution INSTILL 1 DROP INTO EACH EYE ONCE DAILY AT NIGHT    lisinopriL (PRINIVIL,ZESTRIL) 40 MG tablet Take 40 mg by mouth once daily.    metFORMIN (GLUCOPHAGE) 1000 MG tablet Take  1,000 mg by mouth 2 (two) times daily with meals.    mirtazapine (REMERON) 15 MG tablet Take 15 mg by mouth every evening.    NOVOLOG FLEXPEN U-100 INSULIN 100 unit/mL (3 mL) InPn pen INJECT 10 UNITS SUBCUTANEOUSLY THREE TIMES DAILY BEFORE MEAL(S)    OZEMPIC 0.25 mg or 0.5 mg(2 mg/1.5 mL) pen injector INJECT 0.25MG SUBCUTANEOUSLY ONCE A WEEK    pantoprazole (PROTONIX) 40 MG tablet Take 40 mg by mouth every morning.    rosuvastatin (CRESTOR) 40 MG Tab Take 40 mg by mouth once daily.    spironolactone (ALDACTONE) 25 MG tablet Take 25 mg by mouth once daily.    traZODone (DESYREL) 50 MG tablet Take 100 mg by mouth nightly.    valsartan (DIOVAN) 40 MG tablet Take 40 mg by mouth 2 (two) times daily.    venlafaxine (EFFEXOR-XR) 150 MG Cp24 Take 150 mg by mouth once daily.    [DISCONTINUED] cephALEXin (KEFLEX) 500 MG capsule TAKE 1 CAPSULE BY MOUTH EVERY 8 HOURS FOR 10 DAYS     Family History    None       Tobacco Use    Smoking status: Not on file    Smokeless tobacco: Not on file   Substance and Sexual Activity    Alcohol use: Not on file    Drug use: Not on file    Sexual activity: Not on file     Review of Systems   Constitutional:  Positive for fatigue. Negative for fever.   Eyes:  Negative for visual disturbance.   Respiratory:  Negative for cough, choking and chest tightness.    Cardiovascular:  Negative for chest pain.   Gastrointestinal:  Negative for abdominal pain, diarrhea, nausea and vomiting.   Endocrine: Positive for polydipsia and polyuria.   Genitourinary:  Positive for frequency. Negative for difficulty urinating and dysuria.   Musculoskeletal:  Negative for arthralgias and gait problem.   Skin:  Negative for color change.   Neurological:  Positive for light-headedness. Negative for dizziness.   Psychiatric/Behavioral:  Negative for agitation and confusion.      Objective:     Vital Signs (Most Recent):  Temp: 98 °F (36.7 °C) (11/06/23 1905)  Pulse: 84 (11/06/23 1905)  Resp: 17 (11/06/23  "1905)  BP: 138/80 (11/06/23 1905)  SpO2: 99 % (11/06/23 1905) Vital Signs (24h Range):  Temp:  [98 °F (36.7 °C)-98.4 °F (36.9 °C)] 98 °F (36.7 °C)  Pulse:  [81-94] 84  Resp:  [16-18] 17  SpO2:  [98 %-100 %] 99 %  BP: (104-138)/(58-80) 138/80     Weight: 105.2 kg (232 lb)  There is no height or weight on file to calculate BMI.     Physical Exam  Constitutional:       General: He is not in acute distress.     Appearance: He is well-developed. He is not ill-appearing or diaphoretic.   HENT:      Head: Normocephalic and atraumatic.      Mouth/Throat:      Mouth: Mucous membranes are dry.   Eyes:      Extraocular Movements: Extraocular movements intact.   Cardiovascular:      Rate and Rhythm: Normal rate and regular rhythm.   Pulmonary:      Effort: Pulmonary effort is normal. No respiratory distress.      Breath sounds: No wheezing or rales.   Abdominal:      Palpations: Abdomen is soft.      Tenderness: There is no abdominal tenderness.   Musculoskeletal:      Comments: Right  BKA   Skin:     General: Skin is warm and dry.   Neurological:      General: No focal deficit present.      Mental Status: He is alert.   Psychiatric:         Mood and Affect: Mood normal.         Behavior: Behavior normal.                Significant Labs: All pertinent labs within the past 24 hours have been reviewed.    Significant Imaging: I have reviewed all pertinent imaging results/findings within the past 24 hours.    Assessment/Plan:     * Hyperglycemia due to diabetes mellitus  Mr. Mckeon presents with fatigue, thirst and frequent urination for the better part of the year. All of his care it outside hospitals.     [facility] hgb a1c 14.9 09/2023  Glucose 642 on admission  No anion gap  Bicarb 19  BHB WNL  PH 7.33  No signs of DKA, given 1L IVF in ER and 6 units IV insulin  Home regimen: 20 units "basalar" (patient unsure of which type) BID and novolog 10 units TIDWM  Start weight based insulin: 12 U detemir BID and 5 U aspart " TIDWM  Diabetic diet with dietician consult      Acute kidney injury  Cr baseline around 1.1   Cr 2.2 on admission  Suspect secondary to hyperglycemia/dehydration  Holding ace inhibitor  Given 1 L IVF in ER  Trend on AM labs  Strict I/Os    Chronic combined systolic and diastolic heart failure  TTE with cCHFrEF 35-40%, grade II diastolic dysfunction  Does not appear to be in acute exacerbation  CXR unremarkable  Will check BNP  Holding aceI with ROXANNE, continue coreg      Primary hypertension  Stable  Holding home lisinopril with ROXANNE >> states he was on valsartan as well  Continue coreg 6.25 mg BID    Iron deficiency anemia due to chronic blood loss  hgb 10.6  Stable, continue home iron supplement    Major depressive disorder, recurrent episode, mild  Continue home effexor          VTE Risk Mitigation (From admission, onward)         Ordered     IP VTE HIGH RISK PATIENT  Once         11/06/23 2006                             Sandra Hinds PA-C  Department of Hospital Medicine  [facility] ([address])

## 2023-11-07 NOTE — SUBJECTIVE & OBJECTIVE
Past Medical History:   Diagnosis Date    CHF (congestive heart failure)     Diabetes mellitus     type 2    Heart failure, unspecified     Hypertension     Unspecified glaucoma        Past Surgical History:   Procedure Laterality Date    AMPUTATION Right     below the knee of r side.  also amputation of L toe       Review of patient's allergies indicates:  No Known Allergies    No current facility-administered medications on file prior to encounter.     Current Outpatient Medications on File Prior to Encounter   Medication Sig    aspirin (ECOTRIN) 81 MG EC tablet Take 1 tablet by mouth once daily.    blood sugar diagnostic Strp 10 strips by Other route 3 (three) times daily as needed.    blood sugar diagnostic Strp 1 strip. Test blood sugar three times daily    blood-glucose meter kit Use as instructed    lancets 30 gauge Misc 1 each by Other route 3 (three) times daily as needed.    senna (SENOKOT) 8.6 mg tablet Take 1 tablet by mouth once daily.    amLODIPine (NORVASC) 10 MG tablet Take 10 mg by mouth once daily.    carvediloL (COREG) 6.25 MG tablet Take 6.25 mg by mouth 2 (two) times daily with meals.    COMBIGAN 0.2-0.5 % Drop INSTILL 1 DROP INTO EACH EYE EVERY 12 HOURS    dorzolamide (TRUSOPT) 2 % ophthalmic solution INSTILL 1 DROPS INTO EACH EYE THREE TIMES DAILY    FEROSUL 325 mg (65 mg iron) Tab tablet Take by mouth every other day.    FLUoxetine 20 MG capsule TAKE 1 CAPSULE BY MOUTH ONCE DAILY FOR 21 DAYS    furosemide (LASIX) 40 MG tablet Take 40 mg by mouth once daily.    glycopyrrolate (ROBINUL) 1 mg Tab Take 1 mg by mouth 2 (two) times daily.    JARDIANCE 10 mg tablet Take 10 mg by mouth once daily.    LANTUS SOLOSTAR U-100 INSULIN glargine 100 units/mL (3mL) SubQ pen INJECT 30 UNITS SUBCUTANEOUSLY TWICE DAILY    latanoprost 0.005 % ophthalmic solution INSTILL 1 DROP INTO EACH EYE ONCE DAILY AT NIGHT    lisinopriL (PRINIVIL,ZESTRIL) 40 MG tablet Take 40 mg by mouth once daily.    metFORMIN (GLUCOPHAGE)  1000 MG tablet Take 1,000 mg by mouth 2 (two) times daily with meals.    mirtazapine (REMERON) 15 MG tablet Take 15 mg by mouth every evening.    NOVOLOG FLEXPEN U-100 INSULIN 100 unit/mL (3 mL) InPn pen INJECT 10 UNITS SUBCUTANEOUSLY THREE TIMES DAILY BEFORE MEAL(S)    OZEMPIC 0.25 mg or 0.5 mg(2 mg/1.5 mL) pen injector INJECT 0.25MG SUBCUTANEOUSLY ONCE A WEEK    pantoprazole (PROTONIX) 40 MG tablet Take 40 mg by mouth every morning.    rosuvastatin (CRESTOR) 40 MG Tab Take 40 mg by mouth once daily.    spironolactone (ALDACTONE) 25 MG tablet Take 25 mg by mouth once daily.    traZODone (DESYREL) 50 MG tablet Take 100 mg by mouth nightly.    valsartan (DIOVAN) 40 MG tablet Take 40 mg by mouth 2 (two) times daily.    venlafaxine (EFFEXOR-XR) 150 MG Cp24 Take 150 mg by mouth once daily.    [DISCONTINUED] cephALEXin (KEFLEX) 500 MG capsule TAKE 1 CAPSULE BY MOUTH EVERY 8 HOURS FOR 10 DAYS     Family History    None       Tobacco Use    Smoking status: Not on file    Smokeless tobacco: Not on file   Substance and Sexual Activity    Alcohol use: Not on file    Drug use: Not on file    Sexual activity: Not on file     Review of Systems   Constitutional:  Positive for fatigue. Negative for fever.   Eyes:  Negative for visual disturbance.   Respiratory:  Negative for cough, choking and chest tightness.    Cardiovascular:  Negative for chest pain.   Gastrointestinal:  Negative for abdominal pain, diarrhea, nausea and vomiting.   Endocrine: Positive for polydipsia and polyuria.   Genitourinary:  Positive for frequency. Negative for difficulty urinating and dysuria.   Musculoskeletal:  Negative for arthralgias and gait problem.   Skin:  Negative for color change.   Neurological:  Positive for light-headedness. Negative for dizziness.   Psychiatric/Behavioral:  Negative for agitation and confusion.      Objective:     Vital Signs (Most Recent):  Temp: 98 °F (36.7 °C) (11/06/23 1905)  Pulse: 84 (11/06/23 1905)  Resp: 17  (11/06/23 1905)  BP: 138/80 (11/06/23 1905)  SpO2: 99 % (11/06/23 1905) Vital Signs (24h Range):  Temp:  [98 °F (36.7 °C)-98.4 °F (36.9 °C)] 98 °F (36.7 °C)  Pulse:  [81-94] 84  Resp:  [16-18] 17  SpO2:  [98 %-100 %] 99 %  BP: (104-138)/(58-80) 138/80     Weight: 105.2 kg (232 lb)  There is no height or weight on file to calculate BMI.     Physical Exam  Constitutional:       General: He is not in acute distress.     Appearance: He is well-developed. He is not ill-appearing or diaphoretic.   HENT:      Head: Normocephalic and atraumatic.      Mouth/Throat:      Mouth: Mucous membranes are dry.   Eyes:      Extraocular Movements: Extraocular movements intact.   Cardiovascular:      Rate and Rhythm: Normal rate and regular rhythm.   Pulmonary:      Effort: Pulmonary effort is normal. No respiratory distress.      Breath sounds: No wheezing or rales.   Abdominal:      Palpations: Abdomen is soft.      Tenderness: There is no abdominal tenderness.   Musculoskeletal:      Comments: Right  BKA   Skin:     General: Skin is warm and dry.   Neurological:      General: No focal deficit present.      Mental Status: He is alert.   Psychiatric:         Mood and Affect: Mood normal.         Behavior: Behavior normal.                Significant Labs: All pertinent labs within the past 24 hours have been reviewed.    Significant Imaging: I have reviewed all pertinent imaging results/findings within the past 24 hours.

## 2023-11-07 NOTE — PLAN OF CARE
POC reviewed with patient. VSS on RA. C/O generalized body aches relieved by PRN tylenol. Pt shifts weight in bed independently. Blood glucose monitoring/mgmt. Free from falls; safety precautions in place. Care ongoing.

## 2023-11-07 NOTE — PLAN OF CARE
LMSW met with the patient at the bedside. Patient is alert and oriented with no communication barriers. Patient denies the use of HH. Patient has a WC and a motorized WC. Patients PCP is Dr. Johnson at [facility]. Patient choice pharmacy is bedside. Patients' family will transport the patient home at discharge.     [facility] - Med Surg ([address])  Initial Discharge Assessment       Primary Care Provider: Keshia, Primary Doctor    Admission Diagnosis: SOB (shortness of breath) [R06.02]  Hyperglycemia [R73.9]  Chest pain [R07.9]  Acute kidney injury [N17.9]    Admission Date: 11/6/2023  Expected Discharge Date:     Transition of Care Barriers: (P) None    Payor: MEDICAID / Plan[place]KE CHARGE / Product Type: Government /     Extended Emergency Contact Information  Primary Emergency Contact: Diane Mckeon  Mobile Phone: [phone]  Relation: Spouse  Preferred language: English   needed? No    Discharge Plan A: (P) Home with family, Home  Discharge Plan B: (P) Home Health      [facility] [address] [place] [address] [address] [place] [id number]  Phone: [phone] Fax: [phone]      Initial Assessment (most recent)       Adult Discharge Assessment - 11/07/23 0926          Discharge Assessment    Assessment Type Discharge Planning Assessment (P)      Confirmed/corrected address, phone number and insurance Yes (P)      Confirmed Demographics Correct on Facesheet (P)      Source of Information patient (P)      People in Home spouse (P)      Do you expect to return to your current living situation? Yes (P)      Do you have help at home or someone to help you manage your care at home? Yes (P)      Who are your caregiver(s) and their phone number(s)? Diane Mckeon (Spouse)   [phone] (P)      Prior to hospitilization cognitive status: Alert/Oriented;No Deficits (P)      Current cognitive status: Alert/Oriented;No Deficits (P)      Equipment  Currently Used at Home wheelchair (P)      Readmission within 30 days? No (P)      Patient currently being followed by outpatient case management? No (P)      Do you currently have service(s) that help you manage your care at home? No (P)      Do you take prescription medications? Yes (P)      Do you have prescription coverage? Yes (P)      Do you have any problems affording any of your prescribed medications? No (P)      Is the patient taking medications as prescribed? yes (P)      How do you get to doctors appointments? family or friend will provide;car, drives self (P)      Are you on dialysis? No (P)    Patient is diabetic    Do you take coumadin? No (P)      DME Needed Upon Discharge  none (P)      Discharge Plan discussed with: Patient (P)      Transition of Care Barriers None (P)      Discharge Plan A Home with family;Home (P)      Discharge Plan B Home Health (P)

## 2023-11-07 NOTE — ASSESSMENT & PLAN NOTE
Stable  Holding home lisinopril with ROXANNE >> states he was on valsartan as well  Continue coreg 6.25 mg BID

## 2023-11-07 NOTE — ASSESSMENT & PLAN NOTE
TTE with cCHFrEF 35-40%, grade II diastolic dysfunction  Does not appear to be in acute exacerbation  CXR unremarkable  Will check BNP  Holding aceI with ROXANNE, continue coreg

## 2023-11-07 NOTE — CONSULTS
[facility] - Med Surg ([address])  Wound Care    Patient Name:  Chi Mckeon   MRN:  [id number]  Date: 11/7/2023  Diagnosis: Hyperglycemia due to diabetes mellitus    History:     Past Medical History:   Diagnosis Date    CHF (congestive heart failure)     Diabetes mellitus     type 2    Heart failure, unspecified     Hypertension     Unspecified glaucoma        Social History     Socioeconomic History    Marital status:        Precautions:     Allergies as of 11/06/2023    (No Known Allergies)       [facility] Assessment Details/Treatment     Wound care consult consult received for assessment of right knee. Patient is a  58M with CHFrEF 35-40%, type 2 diabetes (last A1c 14.9 09/22/23) and obesity who presents for generalized weakness. Patient reports that he has wounds with delayed healed due to pressure from his prosthesis.    Upon assessment to right knee noted present on admit stage 3 pressure injury. Right knee with two full thickness wounds with dry fibrinous wound bed. Cleansed wounds with Vashe and applied triad paste to open wounds and covered with silicone foam dressing.     Recommend every other day triad paste application to right knee wounds to promote autolytic debridement and moist wound healing. Nursing and MD team notified. Orders placed. Nursing to maintain pressure injury prevention interventions. Wound care to follow pt.        11/07/23 0955        Altered Skin Integrity 11/06/23 2251 Right anterior Knee #1 Ulceration Full thickness tissue loss. Subcutaneous fat may be visible but bone, tendon or muscle are not exposed   Date First Assessed/Time First Assessed: 11/06/23 2251   Altered Skin Integrity Present on Admission - Did Patient arrive to the hospital with altered skin?: yes  Side: Right  Orientation: anterior  Location: Knee  Wound Number: #1  Primary Wound Type...   Wound Image    Description of Altered Skin Integrity Full thickness tissue loss. Subcutaneous fat may be visible but bone,  tendon or muscle are not exposed   Dressing Appearance Open to air   Drainage Amount None   Appearance Yellow;Pink;Fibrin;Dry   Tissue loss description Full thickness   Periwound Area Dry;Scar tissue   Wound Edges Defined   Wound Length (cm) 5 cm   Wound Width (cm) 3 cm   Wound Depth (cm) 0.2 cm   Wound Volume (cm^3) 3 cm^3   Wound Surface Area (cm^2) 15 cm^2   Care Cleansed with:;Antimicrobial agent;Applied:  (triad paste)   Dressing Applied;Silicone;Foam   Dressing Change Due 11/09/23 11/07/2023

## 2023-11-07 NOTE — NURSING
Nurses Note -- 4 Eyes      11/6/2023   10:59 PM      Skin assessed during: Admit      [] No Altered Skin Integrity Present    []Prevention Measures Documented      [x] Yes- Altered Skin Integrity Present or Discovered   [x] LDA Added if Not in Epic (Describe Wound)   [] New Altered Skin Integrity was Present on Admit and Documented in LDA   [] Wound Image Taken    Wound Care Consulted? Yes    Attending Nurse:  Alayna Del Real RN/Staff Member:  Radha serrano

## 2023-11-07 NOTE — ASSESSMENT & PLAN NOTE
Cr baseline around 1.1   Cr 2.2 on admission  Suspect secondary to hyperglycemia/dehydration  Holding ace inhibitor ( was also taking valsartan? Will discontinue at dc)  Given 1 L IVF in ER  Trend on AM labs: improving 1.6  Strict I/Os

## 2023-11-07 NOTE — PLAN OF CARE
Recommendations  1) Rec adding Greg BID to promote wound healing   - consider the addition wound healing supplements MVI, zinc, and vitamin C daily    2) Continue diabetic diet and encourage intake of meals    3)  If pt PO < 50% add Boost Glucose Control daily     Goals:   Pt will tolerate >75% intake of meals by RD follow up  Nutrition Goal Status: new  Communication of RD Recs:  (POC)

## 2023-11-07 NOTE — ASSESSMENT & PLAN NOTE
Cr baseline around 1.1   Cr 2.2 on admission  Suspect secondary to hyperglycemia/dehydration  Holding ace inhibitor  Given 1 L IVF in ER  Trend on AM labs  Strict I/Os

## 2023-11-07 NOTE — PROGRESS NOTES
[facility] Surg [address] Medicine  Progress Note    Patient Name: Chi Mckeon  MRN: [id number]  Patient Class: OP- Observation   Admission Date: 11/6/2023  Length of Stay: 0 days  Attending Physician: Luis Luciano MD  Primary Care Provider: Keshia, Primary Doctor        Subjective:     Principal Problem:Hyperglycemia due to diabetes mellitus        HPI:  Chi Mckeon is a 58M with CHFrEF 35-40%, type 2 diabetes (last A1c 14.9 09/22/23) and obesity who presents for generalized weakness.  The majority of his care is from [facility] and [facility].  For the past year he says he feels weak and tired.  Recently he has been increasingly more thirsty and notices his sugars have been running higher.  At home he reports compliance with his diabetic regimen which includes 20 units of basal insulin twice a day and 10 units of NovoLog 3 times daily with meals. He cannot recall all of his medicines, but states that due to insurance he recently had to switch insulin brands and believes this is why his sugar has been running high. When asked about diet he says he tries not to eat when his sugars are high but will end up snacking on things like peanut butter if he gets hungry.     In the ER he is afebrile without leukocytosis, CBC shows a hemoglobin of 10.6 which is his baseline, CMP reveals a glucose of 642, creatinine 2.2, bicarb 19.  Beta hydroxybutyrate is within normal limits. pH is 7.33; chest x-ray unremarkable.  He was given 1 L of IV fluids and 6 units of IV insulin      Overview/Hospital Course:  Chi Mckeon was admitted for hyperglycemia in setting of type 2 diabetes without evidence of DKA. Also noted to have an ROXANNE on admission. Glucose and creatinine improving with insulin titration. Continue to monitor, refer to endocrine at discharge      Interval History: Glucose and cr improving, up titrating insulin. Seen at bedside, reports improvement in his weakness and thirst this morning    Review of  Systems   Constitutional:  Positive for fatigue. Negative for fever.   Respiratory:  Negative for cough.    Cardiovascular:  Negative for chest pain.   Gastrointestinal:  Negative for abdominal pain, diarrhea, nausea and vomiting.   Endocrine: Positive for polydipsia (improving) and polyuria.   Genitourinary:  Positive for frequency. Negative for difficulty urinating.   Musculoskeletal:  Negative for arthralgias and gait problem.   Neurological:  Positive for light-headedness. Negative for dizziness.   Psychiatric/Behavioral:  Negative for agitation and confusion.      Objective:     Vital Signs (Most Recent):  Temp: 98.3 °F (36.8 °C) (11/07/23 0733)  Pulse: 98 (11/07/23 1202)  Resp: 17 (11/07/23 0733)  BP: 122/78 (11/07/23 0733)  SpO2: 98 % (11/07/23 0733) Vital Signs (24h Range):  Temp:  [98 °F (36.7 °C)-98.8 °F (37.1 °C)] 98.3 °F (36.8 °C)  Pulse:  [78-99] 98  Resp:  [16-18] 17  SpO2:  [95 %-100 %] 98 %  BP: (104-138)/(58-80) 122/78     Weight: 89.8 kg (197 lb 15.6 oz)  Body mass index is 24.74 kg/m².    Intake/Output Summary (Last 24 hours) at 11/7/2023 1249  Last data filed at 11/7/2023 0601  Gross per 24 hour   Intake 360 ml   Output 700 ml   Net -340 ml         Physical Exam  Constitutional:       General: He is not in acute distress.     Appearance: He is well-developed. He is not ill-appearing.   HENT:      Head: Normocephalic and atraumatic.   Cardiovascular:      Rate and Rhythm: Normal rate and regular rhythm.   Pulmonary:      Effort: Pulmonary effort is normal. No respiratory distress.   Abdominal:      Palpations: Abdomen is soft.      Tenderness: There is no abdominal tenderness.   Musculoskeletal:      Comments: Right  BKA   Skin:     General: Skin is warm and dry.   Neurological:      General: No focal deficit present.      Mental Status: He is alert.   Psychiatric:         Mood and Affect: Mood normal.         Behavior: Behavior normal.             Significant Labs: All pertinent labs within the  "past 24 hours have been reviewed.    Significant Imaging: I have reviewed all pertinent imaging results/findings within the past 24 hours.      Assessment/Plan:      * Hyperglycemia due to diabetes mellitus  Mr. Mckeon presents with fatigue, thirst and frequent urination for the better part of the year. All of his care it outside hospitals.     Per [facility] hgb a1c 14.9 09/2023  Glucose 642 on admission  No anion gap  Bicarb 19  BHB WNL  PH 7.33  No signs of DKA, given 1L IVF in ER and 6 units IV insulin  Home regimen: 20 units "basalar" (patient unsure of which type) BID and novolog 10 units TIDWM  Start weight based insulin: 12 U detemir BID and 5 U aspart TIDWM >> increasing to 15U BID  Trend glucose: improving  Diabetic diet with dietician consult      Acute kidney injury  Cr baseline around 1.1   Cr 2.2 on admission  Suspect secondary to hyperglycemia/dehydration  Holding ace inhibitor ( was also taking valsartan? Will discontinue at dc)  Given 1 L IVF in ER  Trend on AM labs: improving 1.6  Strict I/Os    Chronic combined systolic and diastolic heart failure  TTE with cCHFrEF 35-40%, grade II diastolic dysfunction  Does not appear to be in acute exacerbation  CXR unremarkable  Holding aceI with ROXANNE, continue coreg      Primary hypertension  Stable  Holding home lisinopril with ROXANNE >> states he was on valsartan as well  Continue coreg 6.25 mg BID    Iron deficiency anemia due to chronic blood loss  hgb 10.6  Stable, continue home iron supplement    Major depressive disorder, recurrent episode, mild  Continue home effexor          VTE Risk Mitigation (From admission, onward)         Ordered     IP VTE HIGH RISK PATIENT  Once         11/06/23 2006                Discharge Planning   KAMERON: 11/8/2023     Code Status: Full Code   Is the patient medically ready for discharge?:     Reason for patient still in hospital (select all that apply): Patient trending condition  Discharge Plan A: Home with family, Home    "               Sandra Hinds PA-C  Department of Hospital Medicine   [facility] - Med Surg ([address])

## 2023-11-07 NOTE — SUBJECTIVE & OBJECTIVE
Interval History: Glucose and cr improving, up titrating insulin. Seen at bedside, reports improvement in his weakness and thirst this morning    Review of Systems   Constitutional:  Positive for fatigue. Negative for fever.   Respiratory:  Negative for cough.    Cardiovascular:  Negative for chest pain.   Gastrointestinal:  Negative for abdominal pain, diarrhea, nausea and vomiting.   Endocrine: Positive for polydipsia (improving) and polyuria.   Genitourinary:  Positive for frequency. Negative for difficulty urinating.   Musculoskeletal:  Negative for arthralgias and gait problem.   Neurological:  Positive for light-headedness. Negative for dizziness.   Psychiatric/Behavioral:  Negative for agitation and confusion.      Objective:     Vital Signs (Most Recent):  Temp: 98.3 °F (36.8 °C) (11/07/23 0733)  Pulse: 98 (11/07/23 1202)  Resp: 17 (11/07/23 0733)  BP: 122/78 (11/07/23 0733)  SpO2: 98 % (11/07/23 0733) Vital Signs (24h Range):  Temp:  [98 °F (36.7 °C)-98.8 °F (37.1 °C)] 98.3 °F (36.8 °C)  Pulse:  [78-99] 98  Resp:  [16-18] 17  SpO2:  [95 %-100 %] 98 %  BP: (104-138)/(58-80) 122/78     Weight: 89.8 kg (197 lb 15.6 oz)  Body mass index is 24.74 kg/m².    Intake/Output Summary (Last 24 hours) at 11/7/2023 1249  Last data filed at 11/7/2023 0601  Gross per 24 hour   Intake 360 ml   Output 700 ml   Net -340 ml         Physical Exam  Constitutional:       General: He is not in acute distress.     Appearance: He is well-developed. He is not ill-appearing.   HENT:      Head: Normocephalic and atraumatic.   Cardiovascular:      Rate and Rhythm: Normal rate and regular rhythm.   Pulmonary:      Effort: Pulmonary effort is normal. No respiratory distress.   Abdominal:      Palpations: Abdomen is soft.      Tenderness: There is no abdominal tenderness.   Musculoskeletal:      Comments: Right  BKA   Skin:     General: Skin is warm and dry.   Neurological:      General: No focal deficit present.      Mental Status: He is  alert.   Psychiatric:         Mood and Affect: Mood normal.         Behavior: Behavior normal.             Significant Labs: All pertinent labs within the past 24 hours have been reviewed.    Significant Imaging: I have reviewed all pertinent imaging results/findings within the past 24 hours.

## 2023-11-07 NOTE — HPI
Chi Mckeon is a 58M with CHFrEF 35-40%, type 2 diabetes (last A1c 14.9 09/22/23) and obesity who presents for generalized weakness.  The majority of his care is from [facility] and [facility].  For the past year he says he feels weak and tired.  Recently he has been increasingly more thirsty and notices his sugars have been running higher.  At home he reports compliance with his diabetic regimen which includes 20 units of basal insulin twice a day and 10 units of NovoLog 3 times daily with meals. He cannot recall all of his medicines, but states that due to insurance he recently had to switch insulin brands and believes this is why his sugar has been running high. When asked about diet he says he tries not to eat when his sugars are high but will end up snacking on things like peanut butter if he gets hungry.     In the ER he is afebrile without leukocytosis, CBC shows a hemoglobin of 10.6 which is his baseline, CMP reveals a glucose of 642, creatinine 2.2, bicarb 19.  Beta hydroxybutyrate is within normal limits. pH is 7.33; chest x-ray unremarkable.  He was given 1 L of IV fluids and 6 units of IV insulin

## 2023-11-07 NOTE — ASSESSMENT & PLAN NOTE
"Mr. Mckeon presents with fatigue, thirst and frequent urination for the better part of the year. All of his care it outside hospitals.     Per [facility] hgb a1c 14.9 09/2023  Glucose 642 on admission  No anion gap  Bicarb 19  BHB WNL  PH 7.33  No signs of DKA, given 1L IVF in ER and 6 units IV insulin  Home regimen: 20 units "basalar" (patient unsure of which type) BID and novolog 10 units TIDWM  Start weight based insulin: 12 U detemir BID and 5 U aspart TIDWM  Diabetic diet with dietician consult    "

## 2023-11-07 NOTE — ASSESSMENT & PLAN NOTE
TTE with cCHFrEF 35-40%, grade II diastolic dysfunction  Does not appear to be in acute exacerbation  CXR unremarkable  Holding aceI with ROXANNE, continue coreg

## 2023-11-07 NOTE — ASSESSMENT & PLAN NOTE
"Mr. Mckeon presents with fatigue, thirst and frequent urination for the better part of the year. All of his care it outside hospitals.     Per [facility] hgb a1c 14.9 09/2023  Glucose 642 on admission  No anion gap  Bicarb 19  BHB WNL  PH 7.33  No signs of DKA, given 1L IVF in ER and 6 units IV insulin  Home regimen: 20 units "basalar" (patient unsure of which type) BID and novolog 10 units TIDWM  Start weight based insulin: 12 U detemir BID and 5 U aspart TIDWM >> increasing to 15U BID  Trend glucose: improving  Diabetic diet with dietician consult    "

## 2023-11-07 NOTE — CONSULTS
[facility] ([address])  Adult Nutrition  Consult Note    SUMMARY     Recommendations  1) Rec adding Greg BID to promote wound healing   - consider the addition wound healing supplements MVI, zinc, and vitamin C daily    2) Continue diabetic diet and encourage intake of meals    3)  If pt PO < 50% add Boost Glucose Control daily    Goals:   Pt will tolerate >75% intake of meals by RD follow up  Nutrition Goal Status: new  Communication of RD Recs:  (POC)    Assessment and Plan  Nutrition Problem  Excessive carbohydrate intake    Related to (etiology):   DMII    Signs and Symptoms (as evidenced by):   A1c 14.9 - consistent with poorly controlled DM; Pt hx of R BKA 2/2 diabetic neuropathy     Interventions (treatment strategy):  Nutrition Education - Diabetic Diet (11/7/23)  CHO modified diet  Collaboration with other providers    Nutrition Diagnosis Status:   New      Malnutrition Assessment     Musculoskeletal/Lower Extremities:  (R BKA)               Clavicle Bone Region (Muscle Loss): mild depletion                 Reason for Assessment    Reason For Assessment: consult (wound to R knee)  Diagnosis: diabetes diagnosis/complications (Hyperglycemia due to diabetes mellitus)  Relevant Medical History:   Patient Active Problem List   Diagnosis    Acute kidney injury    Acquired absence of right leg below knee    Acute on chronic systolic heart failure    Adjustment disorder with mixed anxiety and depressed mood    Atherosclerosis of coronary artery    Cigarette smoker    Depression    Diabetic peripheral neuropathy associated with type 2 diabetes mellitus    Former smoker    Generalized ischemic myocardial dysfunction    HLD (hyperlipidemia)    Hypertensive retinopathy of both eyes, grade 1    Iron deficiency anemia due to chronic blood loss    Major depressive disorder, recurrent episode, mild    Microcytic anemia    Mild nonproliferative diabetic retinopathy of left eye without macular edema associated  with type 2 diabetes mellitus    Multiple thyroid nodules    Primary hypertension    Primary open angle glaucoma (POAG)    S/P unilateral BKA (below knee amputation)    Diabetes mellitus    Chronic combined systolic and diastolic heart failure    Hyperglycemia due to diabetes mellitus     Interdisciplinary Rounds: did not attend  General Information Comments:     Nutrition-Related Diabetes Education    Nutrition Education with handouts:   Plate Method for Diabetes  Non starchy vegetable list  Carbohydrate Counting for people with diabetes    Time Spent:15 min  Learners: pt  Current HbA1c: 14.9    Comments:  Pt receiving a diabetic, cardiac diet with 1500 mL fluid restriction. Tolerating diet, 100% intake for breakfast this am. Pt reports good appetite, but states he sometimes does not have an appetite. Discussed the importance of eating small meals when pt does not have an appetite, or supplement intake with commercial ONS. Pt vague about diet at home, states he likes to cook red beans and rice. Discussed increasing intake of fresh ingredients such as non starchy vegetables and whole grains. Pt reports he does not typically eat vegetables. Discussed the importance of having a protein with carbohydrate. Discussed the importance of having meals throughout the day to prevent snacking on foods that will raise BG. Discussed what a carbohydrate is and which foods spike BG. Reviewed the plate method with pt. Provided handouts to pt and pt voiced understanding. Pt did report weight loss,  lb, now 197 lb. Per office visit note on 10/22/2022 pt weighed 240 lb. NFPE completed, pt nourished but at risk d/t weight loss of 18% in 1 year (not significant) and pt reports loss of appetite. RD to follow along.  Nutrition Discharge Planning: dc on diabetic diet    Nutrition Risk Screen    Nutrition Risk Screen: no indicators present    Nutrition/Diet History    Patient Reported Diet/Restrictions/Preferences: general  Food  "Allergies: NKFA  Factors Affecting Nutritional Intake: decreased appetite    Anthropometrics    Temp: 98.3 °F (36.8 °C)  Height Method: Stated  Height: 6' 3" (190.5 cm)  Height (inches): 75 in  Weight Method: Bed Scale  Weight: 89.8 kg (197 lb 15.6 oz)  Weight (lb): 197.98 lb  Ideal Body Weight (IBW), Male: 196 lb  % Ideal Body Weight, Male (lb): 101.01 %  BMI (Calculated): 24.7  BMI Grade: 18.5-24.9 - normal  Weight Loss: unintentional  Usual Body Weight (UBW), k kg (240 lb x 1 year ago -  office note on 10/2022)  % Usual Body Weight: 82.56  % Weight Change From Usual Weight: -17.62 %  Amputation %: 5.9  Total Amputation %: 5.9  Amputation Ideal Body Weight (IBW), Male (lb): 190.1 lb  Amputee BMI (kg/m2): 26.37 kg/m2       Lab/Procedures/Meds    Pertinent Labs Reviewed: reviewed  CBC:  Recent Labs   Lab 23  0441   WBC 7.26   HGB 9.5*   HCT 30.0*        CMP:  Recent Labs   Lab 23   CALCIUM 9.7   ALBUMIN 2.7*   PROT 6.7      K 4.7   CO2 22*      BUN 35*   CREATININE 1.6*   ALKPHOS 93   ALT 25   AST 14   BILITOT 0.3     Glucose   Date Value Ref Range Status   2023 397 (H) 70 - 110 mg/dL Final       Pertinent Medications Reviewed: reviewed  Scheduled Meds:   aluminum-magnesium hydroxide-simethicone  30 mL Oral QID (AC & HS)    aspirin  81 mg Oral Daily    atorvastatin  80 mg Oral QHS    carvediloL  6.25 mg Oral BID WM    ferrous sulfate  1 tablet Oral Daily    insulin aspart U-100  5 Units Subcutaneous TIDWM    insulin detemir U-100  15 Units Subcutaneous BID    insulin detemir U-100  3 Units Subcutaneous Once    latanoprost  1 drop Both Eyes QHS    mirtazapine  15 mg Oral QHS    senna  1 tablet Oral Daily    sucralfate  1 g Oral Q6H    traZODone  100 mg Oral Nightly    venlafaxine  150 mg Oral Daily     Continuous Infusions:  PRN Meds:.acetaminophen, acetaminophen, albuterol-ipratropium, aluminum-magnesium hydroxide-simethicone, bisacodyL, dextrose 10%, dextrose 10%, " glucagon (human recombinant), glucose, glucose, influenza, melatonin, naloxone, ondansetron, prochlorperazine, simethicone, sodium chloride 0.9%    Home diabetes medication(s):  Diabetes Medications               JARDIANCE 10 mg tablet Take 10 mg by mouth once daily.    LANTUS SOLOSTAR U-100 INSULIN glargine 100 units/mL (3mL) SubQ pen INJECT 30 UNITS SUBCUTANEOUSLY TWICE DAILY    metFORMIN (GLUCOPHAGE) 1000 MG tablet Take 1,000 mg by mouth 2 (two) times daily with meals.    NOVOLOG FLEXPEN U-100 INSULIN 100 unit/mL (3 mL) InPn pen INJECT 10 UNITS SUBCUTANEOUSLY THREE TIMES DAILY BEFORE MEAL(S)    OZEMPIC 0.25 mg or 0.5 mg(2 mg/1.5 mL) pen injector INJECT 0.25MG SUBCUTANEOUSLY ONCE A WEEK          Estimated/Assessed Needs    Weight Used For Calorie Calculations: 89.8 kg (197 lb 15.6 oz)  Energy Calorie Requirements (kcal): 2337  Energy Need Method: [place] Jeor (x 1.3)  Protein Requirements: 81g (0.9 g/kg)  Weight Used For Protein Calculations: 89.8 kg (197 lb 15.6 oz)  Fluid Requirements (mL): 1 mL/kcal  Estimated Fluid Requirement Method:  (or per MD)  RDA Method (mL): 2337         Nutrition Prescription Ordered    Current Diet Order: Diabetic - Cardiac - 2000 kcal - 1500 mL fluid    Evaluation of Received Nutrient/Fluid Intake    I/O: - 340 mL since admit  Energy Calories Required: meeting needs  Protein Required: meeting needs  Fluid Required: meeting needs  Comments: LBM: 11/4/23  Tolerance: tolerating  % Intake of Estimated Energy Needs: 75 - 100 %  % Meal Intake: 75 - 100 %    Intake/Output - Last 3 Shifts         11/05 0700 11/06 0659 11/06 0700 11/07 0659 11/07 0700 11/08 0659    P.O.  360     Total Intake(mL/kg)  360 (4)     Urine (mL/kg/hr)  700     Total Output  700     Net  -340                    Nutrition Risk    Level of Risk/Frequency of Follow-up: moderate       Monitor and Evaluation    Food and Nutrient Intake: energy intake, food and beverage intake  Food and Nutrient Adminstration: diet  order  Physical Activity and Function: nutrition-related ADLs and IADLs  Anthropometric Measurements: weight, weight change  Biochemical Data, Medical Tests and Procedures: electrolyte and renal panel, gastrointestinal profile, glucose/endocrine profile, inflammatory profile, lipid profile  Nutrition-Focused Physical Findings: overall appearance, skin, extremities, muscles and bones       Nutrition Follow-Up    RD Follow-up?: Yes    Maddi Buckner RDN, [place]

## 2023-11-08 VITALS
TEMPERATURE: 98 F | DIASTOLIC BLOOD PRESSURE: 57 MMHG | HEIGHT: 75 IN | OXYGEN SATURATION: 98 % | HEART RATE: 84 BPM | RESPIRATION RATE: 18 BRPM | BODY MASS INDEX: 24.62 KG/M2 | WEIGHT: 198 LBS | SYSTOLIC BLOOD PRESSURE: 100 MMHG

## 2023-11-08 LAB
ALBUMIN SERPL BCP-MCNC: 2.6 G/DL (ref 3.5–5.2)
ALP SERPL-CCNC: 87 U/L (ref 55–135)
ALT SERPL W/O P-5'-P-CCNC: 26 U/L (ref 10–44)
ANION GAP SERPL CALC-SCNC: 9 MMOL/L (ref 8–16)
AST SERPL-CCNC: 23 U/L (ref 10–40)
BACTERIA UR CULT: ABNORMAL
BASOPHILS # BLD AUTO: 0.01 K/UL (ref 0–0.2)
BASOPHILS NFR BLD: 0.2 % (ref 0–1.9)
BILIRUB SERPL-MCNC: 0.2 MG/DL (ref 0.1–1)
BUN SERPL-MCNC: 30 MG/DL (ref 6–20)
CALCIUM SERPL-MCNC: 9.6 MG/DL (ref 8.7–10.5)
CHLORIDE SERPL-SCNC: 106 MMOL/L (ref 95–110)
CO2 SERPL-SCNC: 22 MMOL/L (ref 23–29)
CREAT SERPL-MCNC: 1.4 MG/DL (ref 0.5–1.4)
DIFFERENTIAL METHOD: ABNORMAL
EOSINOPHIL # BLD AUTO: 0.1 K/UL (ref 0–0.5)
EOSINOPHIL NFR BLD: 1 % (ref 0–8)
ERYTHROCYTE [DISTWIDTH] IN BLOOD BY AUTOMATED COUNT: 21.1 % (ref 11.5–14.5)
EST. GFR  (NO RACE VARIABLE): 58 ML/MIN/1.73 M^2
GLUCOSE SERPL-MCNC: 162 MG/DL (ref 70–110)
HCT VFR BLD AUTO: 30.7 % (ref 40–54)
HGB BLD-MCNC: 9.5 G/DL (ref 14–18)
IMM GRANULOCYTES # BLD AUTO: 0.11 K/UL (ref 0–0.04)
IMM GRANULOCYTES NFR BLD AUTO: 1.9 % (ref 0–0.5)
LYMPHOCYTES # BLD AUTO: 1.5 K/UL (ref 1–4.8)
LYMPHOCYTES NFR BLD: 25.5 % (ref 18–48)
MCH RBC QN AUTO: 24.4 PG (ref 27–31)
MCHC RBC AUTO-ENTMCNC: 30.9 G/DL (ref 32–36)
MCV RBC AUTO: 79 FL (ref 82–98)
MONOCYTES # BLD AUTO: 0.7 K/UL (ref 0.3–1)
MONOCYTES NFR BLD: 12.6 % (ref 4–15)
NEUTROPHILS # BLD AUTO: 3.4 K/UL (ref 1.8–7.7)
NEUTROPHILS NFR BLD: 58.8 % (ref 38–73)
NRBC BLD-RTO: 0 /100 WBC
PLATELET # BLD AUTO: 255 K/UL (ref 150–450)
PMV BLD AUTO: 9.4 FL (ref 9.2–12.9)
POCT GLUCOSE: 154 MG/DL (ref 70–110)
POCT GLUCOSE: 302 MG/DL (ref 70–110)
POTASSIUM SERPL-SCNC: 4.8 MMOL/L (ref 3.5–5.1)
PROT SERPL-MCNC: 6.6 G/DL (ref 6–8.4)
RBC # BLD AUTO: 3.89 M/UL (ref 4.6–6.2)
SODIUM SERPL-SCNC: 137 MMOL/L (ref 136–145)
WBC # BLD AUTO: 5.73 K/UL (ref 3.9–12.7)

## 2023-11-08 PROCEDURE — 25000003 PHARM REV CODE 250: Performed by: PHYSICIAN ASSISTANT

## 2023-11-08 PROCEDURE — 85025 COMPLETE CBC W/AUTO DIFF WBC: CPT | Performed by: PHYSICIAN ASSISTANT

## 2023-11-08 PROCEDURE — G0378 HOSPITAL OBSERVATION PER HR: HCPCS

## 2023-11-08 PROCEDURE — 96372 THER/PROPH/DIAG INJ SC/IM: CPT | Performed by: PHYSICIAN ASSISTANT

## 2023-11-08 PROCEDURE — 80053 COMPREHEN METABOLIC PANEL: CPT | Performed by: PHYSICIAN ASSISTANT

## 2023-11-08 PROCEDURE — 36415 COLL VENOUS BLD VENIPUNCTURE: CPT | Performed by: PHYSICIAN ASSISTANT

## 2023-11-08 PROCEDURE — 94761 N-INVAS EAR/PLS OXIMETRY MLT: CPT

## 2023-11-08 PROCEDURE — 99900035 HC TECH TIME PER 15 MIN (STAT)

## 2023-11-08 RX ORDER — INSULIN GLARGINE 100 [IU]/ML
20 INJECTION, SOLUTION SUBCUTANEOUS 2 TIMES DAILY
Qty: 15 ML | Refills: 1 | Status: ON HOLD | OUTPATIENT
Start: 2023-11-08 | End: 2023-11-19 | Stop reason: HOSPADM

## 2023-11-08 RX ORDER — PEN NEEDLE, DIABETIC 30 GX3/16"
1 NEEDLE, DISPOSABLE MISCELLANEOUS
Qty: 100 EACH | Refills: 1 | Status: ON HOLD | OUTPATIENT
Start: 2023-11-08 | End: 2023-11-19 | Stop reason: HOSPADM

## 2023-11-08 RX ADMIN — INSULIN ASPART 8 UNITS: 100 INJECTION, SOLUTION INTRAVENOUS; SUBCUTANEOUS at 08:11

## 2023-11-08 RX ADMIN — ALUMINUM HYDROXIDE, MAGNESIUM HYDROXIDE, AND SIMETHICONE 30 ML: 1200; 120; 1200 SUSPENSION ORAL at 06:11

## 2023-11-08 RX ADMIN — SUCRALFATE 1 G: 1 SUSPENSION ORAL at 06:11

## 2023-11-08 RX ADMIN — INSULIN DETEMIR 15 UNITS: 100 INJECTION, SOLUTION SUBCUTANEOUS at 08:11

## 2023-11-08 RX ADMIN — SENNOSIDES 1 TABLET: 8.6 TABLET, FILM COATED ORAL at 08:11

## 2023-11-08 RX ADMIN — ALUMINUM HYDROXIDE, MAGNESIUM HYDROXIDE, AND SIMETHICONE 30 ML: 1200; 120; 1200 SUSPENSION ORAL at 12:11

## 2023-11-08 RX ADMIN — INSULIN ASPART 8 UNITS: 100 INJECTION, SOLUTION INTRAVENOUS; SUBCUTANEOUS at 12:11

## 2023-11-08 RX ADMIN — CARVEDILOL 6.25 MG: 6.25 TABLET, FILM COATED ORAL at 08:11

## 2023-11-08 RX ADMIN — ASPIRIN 81 MG: 81 TABLET, COATED ORAL at 08:11

## 2023-11-08 RX ADMIN — FERROUS SULFATE TAB 325 MG (65 MG ELEMENTAL FE) 1 EACH: 325 (65 FE) TAB at 08:11

## 2023-11-08 RX ADMIN — SUCRALFATE 1 G: 1 SUSPENSION ORAL at 12:11

## 2023-11-08 RX ADMIN — VENLAFAXINE HYDROCHLORIDE 150 MG: 75 CAPSULE, EXTENDED RELEASE ORAL at 08:11

## 2023-11-08 NOTE — PLAN OF CARE
LMSW met with the patient at the bedside. Patient is alert and oriented with no communication barriers. Patient denies the use of HH. Patient has a WC and a motorized WC. Patient will be contacted regarding his follow up appointments. Patient choice pharmacy is bedside. Patients' family will transport the patient home at discharge.     [facility] ([address])  Discharge Final Note    Primary Care Provider: Kip Devine MD    Expected Discharge Date: 11/8/2023    Final Discharge Note (most recent)       Final Note - 11/08/23 1014          Final Note    Assessment Type Final Discharge Note (P)      Anticipated Discharge Disposition Home or Self Care (P)      Hospital Resources/Appts/Education Provided Provided patient/caregiver with written discharge plan information;Appointments scheduled and added to AVS (P)         Post-Acute Status    Post-Acute Authorization Other (P)      Other Status No Post-Acute Service Needs (P)      Discharge Delays None known at this time (P)                      Contact Info       Kip Devine MD   Specialty: Internal Medicine   Relationship: PCP - General    [address]  [place] [address]   Phone: [phone]       Next Steps: Follow up on 11/8/2023    Instructions: They will contact you to schduled a hopsital follow up appointment.

## 2023-11-08 NOTE — NURSING
Discharge instructions reviewed with patient and he verbalized understanding. PIV intact on removal. AVS given to patient. All belongings packed by patient's wife. Denies further needs. [facility] to transport patient home.

## 2023-11-09 ENCOUNTER — PATIENT OUTREACH (OUTPATIENT)
Dept: ADMINISTRATIVE | Facility: CLINIC | Age: 58
End: 2023-11-09
Payer: COMMERCIAL

## 2023-11-09 NOTE — PROGRESS NOTES
C3 nurse spoke with Chi Mckeon  for a TCC post hospital discharge follow up call. The patient does not have a scheduled HOSFU appointment with Kip Devine MD  within 5-7 days post hospital discharge date 11/8/23. C3 nurse was unable to schedule HOSFU appointment in [facility].    Message sent to PCP staff requesting they contact patient and schedule follow up appointment.

## 2023-11-10 ENCOUNTER — PES CALL (OUTPATIENT)
Dept: HOME HEALTH SERVICES | Facility: CLINIC | Age: 58
End: 2023-11-10
Payer: COMMERCIAL

## 2023-11-10 NOTE — ASSESSMENT & PLAN NOTE
"Mr. Mckeon presents with fatigue, thirst and frequent urination for the better part of the year. All of his care it outside hospitals.     Per careverywhere hgb a1c 14.9 09/2023  Glucose 642 on admission  No anion gap  Bicarb 19  BHB WNL  PH 7.33  No signs of DKA, given 1L IVF in ER and 6 units IV insulin  Home regimen: 20 units "basalar" (patient unsure of which type) BID and novolog 10 units TIDWM  Start weight based insulin: 12 U detemir BID and 5 U aspart TIDWM >> increasing to 15U BID  Trend glucose: improving but labile, 150s- 250s  Stable for dc with insulin needles (suspect not using insulin at home) and continue his regimen of 20U BID and 10U TIDWM, stressed importance of diet  Diabetic diet with dietician consult    "

## 2023-11-10 NOTE — DISCHARGE SUMMARY
[facility] ([address] Medicine  Discharge Summary      Patient Name: Chi Mckeon  MRN: [id number]  MITCH: [id number]  Patient Class: OP- Observation  Admission Date: 11/6/2023  Hospital Length of Stay: 0 days  Discharge Date and Time: 11/8/2023  3:24 PM  Attending Physician: Keshia att. providers found   Discharging Provider: Sandra Hinds PA-C  Primary Care Provider: Kip Devine MD    Primary Care Team: Networked reference to record PCT     HPI:   Chi Mckeon is a 58M with CHFrEF 35-40%, type 2 diabetes (last A1c 14.9 09/22/23) and obesity who presents for generalized weakness.  The majority of his care is from [facility] and [facility].  For the past year he says he feels weak and tired.  Recently he has been increasingly more thirsty and notices his sugars have been running higher.  At home he reports compliance with his diabetic regimen which includes 20 units of basal insulin twice a day and 10 units of NovoLog 3 times daily with meals. He cannot recall all of his medicines, but states that due to insurance he recently had to switch insulin brands and believes this is why his sugar has been running high. When asked about diet he says he tries not to eat when his sugars are high but will end up snacking on things like peanut butter if he gets hungry.     In the ER he is afebrile without leukocytosis, CBC shows a hemoglobin of 10.6 which is his baseline, CMP reveals a glucose of 642, creatinine 2.2, bicarb 19.  Beta hydroxybutyrate is within normal limits. pH is 7.33; chest x-ray unremarkable.  He was given 1 L of IV fluids and 6 units of IV insulin      * No surgery found *      Hospital Course:   Chi Mckeon was admitted for hyperglycemia in setting of type 2 diabetes without evidence of DKA. Also noted to have an ROXANNE on admission. Glucose and creatinine improving with insulin titration. Stable for discharge with refill on insulin pens and discontinuation of valsartan (was taking  "lisinopril and valsartan together). Return precautions discussed, refer to endocrine at discharge.       Goals of Care Treatment Preferences:  Code Status: Full Code      Consults:   Consults (From admission, onward)        Status Ordering Provider     Inpatient consult to Registered Dietitian/Nutritionist  Once        Provider:  (Not yet assigned)    Completed SALOMON LINDSAY     IP consult to case management  Once        Provider:  (Not yet assigned)    Completed SALOMON LINDSAY          Cardiac/Vascular  Primary hypertension  Stable  Holding home lisinopril with ROXANNE >> states he was on valsartan as well, discontinued this  Continue coreg 6.25 mg BID  Resume lisinopril at dc    Endocrine  * Hyperglycemia due to diabetes mellitus  Mr. Mckeon presents with fatigue, thirst and frequent urination for the better part of the year. All of his care it outside hospitals.     Per careverywhere hgb a1c 14.9 09/2023  Glucose 642 on admission  No anion gap  Bicarb 19  BHB WNL  PH 7.33  No signs of DKA, given 1L IVF in ER and 6 units IV insulin  Home regimen: 20 units "basalar" (patient unsure of which type) BID and novolog 10 units TIDWM  Start weight based insulin: 12 U detemir BID and 5 U aspart TIDWM >> increasing to 15U BID  Trend glucose: improving but labile, 150s- 250s  Stable for dc with insulin needles (suspect not using insulin at home) and continue his regimen of 20U BID and 10U TIDWM, stressed importance of diet  Diabetic diet with dietician consult      Final Active Diagnoses:    Diagnosis Date Noted POA    PRINCIPAL PROBLEM:  Hyperglycemia due to diabetes mellitus [E11.65] 11/06/2023 Yes    Acute kidney injury [N17.9] 11/06/2023 Yes    Chronic combined systolic and diastolic heart failure [I50.42] 11/06/2023 Yes    Primary hypertension [I10] 06/07/2010 Yes    Iron deficiency anemia due to chronic blood loss [D50.0] 11/06/2023 Yes    Major depressive disorder, recurrent episode, mild [F33.0] 04/04/2023 Yes    " Depression [F32.A] 07/05/2023 Yes    Microcytic anemia [D50.9] 07/05/2023 Yes    Diabetic peripheral neuropathy associated with type 2 diabetes mellitus [E11.42] 06/06/2022 Yes    HLD (hyperlipidemia) [E78.5] 06/01/2015 Yes    Generalized ischemic myocardial dysfunction [I25.5] 02/26/2013 Yes      Problems Resolved During this Admission:    Diagnosis Date Noted Date Resolved POA    Hyperglycemia [R73.9] 11/06/2023 11/06/2023 Yes       Discharged Condition: stable    Disposition: Home or Self Care    Follow Up:   Follow-up Information     Kip Devine MD Follow up on 11/8/2023.    Specialty: Internal Medicine  Why: They will contact you to schduled a hoptal follow up appointment.  Contact information:  [address]  [place] [address] [phone]             Kip Devine Follow up.    Why: Appt scheduled for January 24, 2024. Dr. Devine office will call the patient to schedule and earlier appointment.  Contact information:  [address], [place] [address]  [phone]                     Patient Instructions:      Ambulatory referral/consult to Endocrinology   Standing Status: Future   Referral Priority: Routine Referral Type: Consultation   Requested Specialty: Endocrinology   Number of Visits Requested: 1     Ambulatory referral/consult to [facility]   Standing Status: Future   Referral Priority: Routine Referral Type: Consultation   Referral Reason: Specialty Services Required   Number of Visits Requested: 1     Notify your health care provider if you experience any of the following:  temperature >100.4     Notify your health care provider if you experience any of the following:  severe uncontrolled pain     Notify your health care provider if you experience any of the following:  redness, tenderness, or signs of infection (pain, swelling, redness, odor or green/yellow discharge around incision site)     Notify your health care provider if you  "experience any of the following:  persistent nausea and vomiting or diarrhea     Notify your health care provider if you experience any of the following:  difficulty breathing or increased cough     Notify your health care provider if you experience any of the following:  worsening rash     Notify your health care provider if you experience any of the following:  increased confusion or weakness     Notify your health care provider if you experience any of the following:  persistent dizziness, light-headedness, or visual disturbances     Activity as tolerated       Significant Diagnostic Studies: Labs: BMP: No results for input(s): "GLU", "NA", "K", "CL", "CO2", "BUN", "CREATININE", "CALCIUM", "MG" in the last 48 hours.    Pending Diagnostic Studies:     None         Medications:  Reconciled Home Medications:      Medication List      START taking these medications    pen needle, diabetic 32 gauge x 5/32" Ndle  1 each by Misc.(Non-Drug; Combo Route) route 4 (four) times daily before meals and nightly.        CHANGE how you take these medications    LANTUS SOLOSTAR U-100 INSULIN glargine 100 units/mL SubQ pen  Generic drug: insulin  Inject 20 Units into the skin 2 (two) times a day.  What changed: See the new instructions.        CONTINUE taking these medications    aspirin 81 MG EC tablet  Commonly known as: ECOTRIN  Take 1 tablet by mouth once daily.     * blood sugar diagnostic Strp  10 strips by Other route 3 (three) times daily as needed.     * blood sugar diagnostic Strp  1 strip. Test blood sugar three times daily     blood-glucose meter kit  Use as instructed     carvediloL 6.25 MG tablet  Commonly known as: COREG  Take 6.25 mg by mouth 2 (two) times daily with meals.     COMBIGAN 0.2-0.5 % Drop  Generic drug: brimonidine-timoloL  INSTILL 1 DROP INTO EACH EYE EVERY 12 HOURS     dorzolamide 2 % ophthalmic solution  Commonly known as: TRUSOPT  INSTILL 1 DROPS INTO EACH EYE THREE TIMES DAILY     FeroSuL 325 mg " (65 mg iron) Tab tablet  Generic drug: ferrous sulfate  Take by mouth every other day.     FLUoxetine 20 MG capsule  TAKE 1 CAPSULE BY MOUTH ONCE DAILY FOR 21 DAYS     furosemide 40 MG tablet  Commonly known as: LASIX  Take 40 mg by mouth once daily.     glycopyrrolate 1 mg Tab  Commonly known as: ROBINUL  Take 1 mg by mouth 2 (two) times daily.     JARDIANCE 10 mg tablet  Generic drug: empagliflozin  Take 10 mg by mouth once daily.     lancets 30 gauge Misc  1 each by Other route 3 (three) times daily as needed.     latanoprost 0.005 % ophthalmic solution  INSTILL 1 DROP INTO EACH EYE ONCE DAILY AT NIGHT     lisinopriL 40 MG tablet  Commonly known as: PRINIVIL,ZESTRIL  Take 40 mg by mouth once daily.     metFORMIN 1000 MG tablet  Commonly known as: GLUCOPHAGE  Take 1,000 mg by mouth 2 (two) times daily with meals.     mirtazapine 15 MG tablet  Commonly known as: REMERON  Take 15 mg by mouth every evening.     NovoLOG FlexPen U-100 Insulin 100 unit/mL (3 mL) Inpn pen  Generic drug: insulin aspart U-100  INJECT 10 UNITS SUBCUTANEOUSLY THREE TIMES DAILY BEFORE MEAL(S)     OZEMPIC 0.25 mg or 0.5 mg(2 mg/1.5 mL) pen injector  Generic drug: semaglutide  INJECT 0.25MG SUBCUTANEOUSLY ONCE A WEEK     pantoprazole 40 MG tablet  Commonly known as: PROTONIX  Take 40 mg by mouth every morning.     rosuvastatin 40 MG Tab  Commonly known as: CRESTOR  Take 40 mg by mouth once daily.     senna 8.6 mg tablet  Commonly known as: SENOKOT  Take 1 tablet by mouth once daily.     spironolactone 25 MG tablet  Commonly known as: ALDACTONE  Take 25 mg by mouth once daily.     traZODone 50 MG tablet  Commonly known as: DESYREL  Take 100 mg by mouth nightly.     venlafaxine 150 MG Cp24  Commonly known as: EFFEXOR-XR  Take 150 mg by mouth once daily.         * This list has 2 medication(s) that are the same as other medications prescribed for you. Read the directions carefully, and ask your doctor or other care provider to review them with  you.            STOP taking these medications    valsartan 40 MG tablet  Commonly known as: DIOVAN            Indwelling Lines/Drains at time of discharge:   Lines/Drains/Airways     None                 Time spent on the discharge of patient: >35 minutes         Sandra Hinds PA-C  Department of Hospital Medicine  [facility] - Med Surg ([address])

## 2023-11-10 NOTE — ASSESSMENT & PLAN NOTE
Stable  Holding home lisinopril with ROXANNE >> states he was on valsartan as well, discontinued this  Continue coreg 6.25 mg BID  Resume lisinopril at dc

## 2023-11-16 ENCOUNTER — HOSPITAL ENCOUNTER (INPATIENT)
Facility: OTHER | Age: 58
LOS: 3 days | Discharge: HOME HEALTH | DRG: 872 | End: 2023-11-19
Attending: EMERGENCY MEDICINE | Admitting: HOSPITALIST
Payer: COMMERCIAL

## 2023-11-16 DIAGNOSIS — K59.00 CONSTIPATION, UNSPECIFIED CONSTIPATION TYPE: ICD-10-CM

## 2023-11-16 DIAGNOSIS — A41.9 SEPSIS, DUE TO UNSPECIFIED ORGANISM, UNSPECIFIED WHETHER ACUTE ORGAN DYSFUNCTION PRESENT: ICD-10-CM

## 2023-11-16 DIAGNOSIS — N39.0 URINARY TRACT INFECTION WITHOUT HEMATURIA, SITE UNSPECIFIED: Primary | ICD-10-CM

## 2023-11-16 DIAGNOSIS — I95.9 HYPOTENSION, UNSPECIFIED HYPOTENSION TYPE: ICD-10-CM

## 2023-11-16 DIAGNOSIS — E86.0 DEHYDRATION: ICD-10-CM

## 2023-11-16 DIAGNOSIS — R10.9 ABDOMINAL PAIN: ICD-10-CM

## 2023-11-16 PROBLEM — I25.10 CHRONIC TOTAL OCCLUSION OF NATIVE CORONARY ARTERY: Status: ACTIVE | Noted: 2023-11-16

## 2023-11-16 PROBLEM — N30.00 ACUTE CYSTITIS: Status: ACTIVE | Noted: 2023-11-16

## 2023-11-16 PROBLEM — N30.80 EMPHYSEMATOUS CYSTITIS: Status: ACTIVE | Noted: 2023-11-16

## 2023-11-16 PROBLEM — I25.82 CHRONIC TOTAL OCCLUSION OF NATIVE CORONARY ARTERY: Status: ACTIVE | Noted: 2023-11-16

## 2023-11-16 PROBLEM — R65.20 SEVERE SEPSIS: Status: ACTIVE | Noted: 2023-11-16

## 2023-11-16 LAB
ALBUMIN SERPL BCP-MCNC: 3.2 G/DL (ref 3.5–5.2)
ALP SERPL-CCNC: 89 U/L (ref 55–135)
ALT SERPL W/O P-5'-P-CCNC: 25 U/L (ref 10–44)
AMPHET+METHAMPHET UR QL: NEGATIVE
ANION GAP SERPL CALC-SCNC: 8 MMOL/L (ref 8–16)
AST SERPL-CCNC: 22 U/L (ref 10–40)
BACTERIA #/AREA URNS HPF: ABNORMAL /HPF
BARBITURATES UR QL SCN>200 NG/ML: NEGATIVE
BASOPHILS # BLD AUTO: 0.01 K/UL (ref 0–0.2)
BASOPHILS NFR BLD: 0.1 % (ref 0–1.9)
BENZODIAZ UR QL SCN>200 NG/ML: NEGATIVE
BILIRUB SERPL-MCNC: 0.4 MG/DL (ref 0.1–1)
BILIRUB UR QL STRIP: NEGATIVE
BUN SERPL-MCNC: 34 MG/DL (ref 6–20)
BZE UR QL SCN: NEGATIVE
CALCIUM SERPL-MCNC: 10.8 MG/DL (ref 8.7–10.5)
CANNABINOIDS UR QL SCN: ABNORMAL
CHLORIDE SERPL-SCNC: 113 MMOL/L (ref 95–110)
CK SERPL-CCNC: 43 U/L (ref 20–200)
CLARITY UR: ABNORMAL
CO2 SERPL-SCNC: 21 MMOL/L (ref 23–29)
COLOR UR: YELLOW
CREAT SERPL-MCNC: 1.5 MG/DL (ref 0.5–1.4)
CREAT UR-MCNC: 93.8 MG/DL (ref 23–375)
CTP QC/QA: YES
CTP QC/QA: YES
DIFFERENTIAL METHOD: ABNORMAL
EOSINOPHIL # BLD AUTO: 0 K/UL (ref 0–0.5)
EOSINOPHIL NFR BLD: 0.6 % (ref 0–8)
ERYTHROCYTE [DISTWIDTH] IN BLOOD BY AUTOMATED COUNT: 20.8 % (ref 11.5–14.5)
EST. GFR  (NO RACE VARIABLE): 53 ML/MIN/1.73 M^2
ETHANOL UR-MCNC: 23 MG/DL
FIO2: 21
GLUCOSE SERPL-MCNC: 265 MG/DL (ref 70–110)
GLUCOSE UR QL STRIP: ABNORMAL
HCT VFR BLD AUTO: 34.5 % (ref 40–54)
HCV AB SERPL QL IA: NEGATIVE
HGB BLD-MCNC: 11 G/DL (ref 14–18)
HGB UR QL STRIP: NEGATIVE
HIV 1+2 AB+HIV1 P24 AG SERPL QL IA: NEGATIVE
HYALINE CASTS #/AREA URNS LPF: 0 /LPF
IMM GRANULOCYTES # BLD AUTO: 0.05 K/UL (ref 0–0.04)
IMM GRANULOCYTES NFR BLD AUTO: 0.7 % (ref 0–0.5)
KETONES UR QL STRIP: ABNORMAL
LDH SERPL L TO P-CCNC: 1.46 MMOL/L (ref 0.5–2.2)
LEUKOCYTE ESTERASE UR QL STRIP: ABNORMAL
LYMPHOCYTES # BLD AUTO: 1.1 K/UL (ref 1–4.8)
LYMPHOCYTES NFR BLD: 15.3 % (ref 18–48)
MCH RBC QN AUTO: 25.3 PG (ref 27–31)
MCHC RBC AUTO-ENTMCNC: 31.9 G/DL (ref 32–36)
MCV RBC AUTO: 80 FL (ref 82–98)
METHADONE UR QL SCN>300 NG/ML: NEGATIVE
MICROSCOPIC COMMENT: ABNORMAL
MONOCYTES # BLD AUTO: 0.6 K/UL (ref 0.3–1)
MONOCYTES NFR BLD: 7.7 % (ref 4–15)
NEUTROPHILS # BLD AUTO: 5.5 K/UL (ref 1.8–7.7)
NEUTROPHILS NFR BLD: 75.6 % (ref 38–73)
NITRITE UR QL STRIP: NEGATIVE
NRBC BLD-RTO: 0 /100 WBC
OPIATES UR QL SCN: NEGATIVE
PCP UR QL SCN>25 NG/ML: NEGATIVE
PH UR STRIP: 6 [PH] (ref 5–8)
PLATELET # BLD AUTO: 346 K/UL (ref 150–450)
PMV BLD AUTO: 8.7 FL (ref 9.2–12.9)
POC MOLECULAR INFLUENZA A AGN: NEGATIVE
POC MOLECULAR INFLUENZA B AGN: NEGATIVE
POC TEMPERATURE: NORMAL
POCT GLUCOSE: 289 MG/DL (ref 70–110)
POTASSIUM SERPL-SCNC: 5.8 MMOL/L (ref 3.5–5.1)
PROCALCITONIN SERPL IA-MCNC: 2.12 NG/ML
PROT SERPL-MCNC: 7.9 G/DL (ref 6–8.4)
PROT UR QL STRIP: ABNORMAL
RBC # BLD AUTO: 4.34 M/UL (ref 4.6–6.2)
RBC #/AREA URNS HPF: 0 /HPF (ref 0–4)
SAMPLE: NORMAL
SARS-COV-2 RDRP RESP QL NAA+PROBE: NEGATIVE
SODIUM SERPL-SCNC: 142 MMOL/L (ref 136–145)
SP GR UR STRIP: 1.02 (ref 1–1.03)
SQUAMOUS #/AREA URNS HPF: 2 /HPF
TOXICOLOGY INFORMATION: ABNORMAL
URN SPEC COLLECT METH UR: ABNORMAL
UROBILINOGEN UR STRIP-ACNC: NEGATIVE EU/DL
WBC # BLD AUTO: 7.25 K/UL (ref 3.9–12.7)
WBC #/AREA URNS HPF: 32 /HPF (ref 0–5)
YEAST URNS QL MICRO: ABNORMAL

## 2023-11-16 PROCEDURE — 63600175 PHARM REV CODE 636 W HCPCS: Performed by: EMERGENCY MEDICINE

## 2023-11-16 PROCEDURE — 99900035 HC TECH TIME PER 15 MIN (STAT)

## 2023-11-16 PROCEDURE — 63600175 PHARM REV CODE 636 W HCPCS: Performed by: NURSE PRACTITIONER

## 2023-11-16 PROCEDURE — 80053 COMPREHEN METABOLIC PANEL: CPT | Performed by: PHYSICIAN ASSISTANT

## 2023-11-16 PROCEDURE — 87040 BLOOD CULTURE FOR BACTERIA: CPT | Mod: 59 | Performed by: EMERGENCY MEDICINE

## 2023-11-16 PROCEDURE — 25000003 PHARM REV CODE 250: Performed by: EMERGENCY MEDICINE

## 2023-11-16 PROCEDURE — 82550 ASSAY OF CK (CPK): CPT | Performed by: NURSE PRACTITIONER

## 2023-11-16 PROCEDURE — 25000003 PHARM REV CODE 250: Performed by: NURSE PRACTITIONER

## 2023-11-16 PROCEDURE — 81000 URINALYSIS NONAUTO W/SCOPE: CPT | Performed by: EMERGENCY MEDICINE

## 2023-11-16 PROCEDURE — 96375 TX/PRO/DX INJ NEW DRUG ADDON: CPT

## 2023-11-16 PROCEDURE — 83605 ASSAY OF LACTIC ACID: CPT | Performed by: EMERGENCY MEDICINE

## 2023-11-16 PROCEDURE — 80307 DRUG TEST PRSMV CHEM ANLYZR: CPT | Performed by: EMERGENCY MEDICINE

## 2023-11-16 PROCEDURE — 87086 URINE CULTURE/COLONY COUNT: CPT | Performed by: EMERGENCY MEDICINE

## 2023-11-16 PROCEDURE — 96365 THER/PROPH/DIAG IV INF INIT: CPT

## 2023-11-16 PROCEDURE — 87077 CULTURE AEROBIC IDENTIFY: CPT | Performed by: EMERGENCY MEDICINE

## 2023-11-16 PROCEDURE — 82962 GLUCOSE BLOOD TEST: CPT

## 2023-11-16 PROCEDURE — 20000000 HC ICU ROOM

## 2023-11-16 PROCEDURE — 36415 COLL VENOUS BLD VENIPUNCTURE: CPT | Performed by: NURSE PRACTITIONER

## 2023-11-16 PROCEDURE — 83605 ASSAY OF LACTIC ACID: CPT

## 2023-11-16 PROCEDURE — 87635 SARS-COV-2 COVID-19 AMP PRB: CPT | Performed by: EMERGENCY MEDICINE

## 2023-11-16 PROCEDURE — 87389 HIV-1 AG W/HIV-1&-2 AB AG IA: CPT | Performed by: EMERGENCY MEDICINE

## 2023-11-16 PROCEDURE — 87088 URINE BACTERIA CULTURE: CPT | Performed by: EMERGENCY MEDICINE

## 2023-11-16 PROCEDURE — 96361 HYDRATE IV INFUSION ADD-ON: CPT

## 2023-11-16 PROCEDURE — 84145 PROCALCITONIN (PCT): CPT | Mod: 91 | Performed by: NURSE PRACTITIONER

## 2023-11-16 PROCEDURE — 99291 CRITICAL CARE FIRST HOUR: CPT

## 2023-11-16 PROCEDURE — 85025 COMPLETE CBC W/AUTO DIFF WBC: CPT | Performed by: PHYSICIAN ASSISTANT

## 2023-11-16 PROCEDURE — 87186 SC STD MICRODIL/AGAR DIL: CPT | Performed by: EMERGENCY MEDICINE

## 2023-11-16 PROCEDURE — 94761 N-INVAS EAR/PLS OXIMETRY MLT: CPT

## 2023-11-16 PROCEDURE — 86803 HEPATITIS C AB TEST: CPT | Performed by: EMERGENCY MEDICINE

## 2023-11-16 RX ORDER — ENOXAPARIN SODIUM 100 MG/ML
40 INJECTION SUBCUTANEOUS EVERY 24 HOURS
Status: DISCONTINUED | OUTPATIENT
Start: 2023-11-16 | End: 2023-11-19 | Stop reason: HOSPADM

## 2023-11-16 RX ORDER — MUPIROCIN 20 MG/G
OINTMENT TOPICAL 2 TIMES DAILY
Status: DISCONTINUED | OUTPATIENT
Start: 2023-11-17 | End: 2023-11-19 | Stop reason: HOSPADM

## 2023-11-16 RX ORDER — SENNOSIDES 8.6 MG/1
1 TABLET ORAL DAILY
Status: DISCONTINUED | OUTPATIENT
Start: 2023-11-17 | End: 2023-11-19 | Stop reason: HOSPADM

## 2023-11-16 RX ORDER — PANTOPRAZOLE SODIUM 40 MG/1
40 TABLET, DELAYED RELEASE ORAL EVERY MORNING
Status: DISCONTINUED | OUTPATIENT
Start: 2023-11-17 | End: 2023-11-19 | Stop reason: HOSPADM

## 2023-11-16 RX ORDER — INSULIN ASPART 100 [IU]/ML
0-5 INJECTION, SOLUTION INTRAVENOUS; SUBCUTANEOUS
Status: DISCONTINUED | OUTPATIENT
Start: 2023-11-16 | End: 2023-11-19 | Stop reason: HOSPADM

## 2023-11-16 RX ORDER — ONDANSETRON 2 MG/ML
4 INJECTION INTRAMUSCULAR; INTRAVENOUS EVERY 8 HOURS PRN
Status: DISCONTINUED | OUTPATIENT
Start: 2023-11-16 | End: 2023-11-19 | Stop reason: HOSPADM

## 2023-11-16 RX ORDER — SODIUM CHLORIDE 0.9 % (FLUSH) 0.9 %
10 SYRINGE (ML) INJECTION
Status: DISCONTINUED | OUTPATIENT
Start: 2023-11-16 | End: 2023-11-19 | Stop reason: HOSPADM

## 2023-11-16 RX ORDER — IBUPROFEN 200 MG
24 TABLET ORAL
Status: DISCONTINUED | OUTPATIENT
Start: 2023-11-16 | End: 2023-11-19 | Stop reason: HOSPADM

## 2023-11-16 RX ORDER — GLUCAGON 1 MG
1 KIT INJECTION
Status: DISCONTINUED | OUTPATIENT
Start: 2023-11-16 | End: 2023-11-19 | Stop reason: HOSPADM

## 2023-11-16 RX ORDER — ATORVASTATIN CALCIUM 20 MG/1
80 TABLET, FILM COATED ORAL DAILY
Status: DISCONTINUED | OUTPATIENT
Start: 2023-11-17 | End: 2023-11-19 | Stop reason: HOSPADM

## 2023-11-16 RX ORDER — ASPIRIN 81 MG/1
81 TABLET ORAL DAILY
Status: DISCONTINUED | OUTPATIENT
Start: 2023-11-17 | End: 2023-11-19 | Stop reason: HOSPADM

## 2023-11-16 RX ORDER — GLYCOPYRROLATE 1 MG/1
1 TABLET ORAL 2 TIMES DAILY
Status: DISCONTINUED | OUTPATIENT
Start: 2023-11-16 | End: 2023-11-19

## 2023-11-16 RX ORDER — PSEUDOEPHEDRINE/ACETAMINOPHEN 30MG-500MG
100 TABLET ORAL
Status: COMPLETED | OUTPATIENT
Start: 2023-11-16 | End: 2023-11-16

## 2023-11-16 RX ORDER — LANOLIN ALCOHOL/MO/W.PET/CERES
1 CREAM (GRAM) TOPICAL EVERY OTHER DAY
Status: DISCONTINUED | OUTPATIENT
Start: 2023-11-17 | End: 2023-11-19 | Stop reason: HOSPADM

## 2023-11-16 RX ORDER — HYDROMORPHONE HYDROCHLORIDE 1 MG/ML
1 INJECTION, SOLUTION INTRAMUSCULAR; INTRAVENOUS; SUBCUTANEOUS EVERY 4 HOURS PRN
Status: DISCONTINUED | OUTPATIENT
Start: 2023-11-16 | End: 2023-11-17

## 2023-11-16 RX ORDER — POLYETHYLENE GLYCOL 3350 17 G/17G
17 POWDER, FOR SOLUTION ORAL 2 TIMES DAILY PRN
Status: DISCONTINUED | OUTPATIENT
Start: 2023-11-16 | End: 2023-11-19 | Stop reason: HOSPADM

## 2023-11-16 RX ORDER — IBUPROFEN 200 MG
16 TABLET ORAL
Status: DISCONTINUED | OUTPATIENT
Start: 2023-11-16 | End: 2023-11-19 | Stop reason: HOSPADM

## 2023-11-16 RX ORDER — NOREPINEPHRINE BITARTRATE/D5W 4MG/250ML
0-3 PLASTIC BAG, INJECTION (ML) INTRAVENOUS CONTINUOUS
Status: DISCONTINUED | OUTPATIENT
Start: 2023-11-16 | End: 2023-11-17

## 2023-11-16 RX ORDER — ACETAMINOPHEN 500 MG
1000 TABLET ORAL
Status: COMPLETED | OUTPATIENT
Start: 2023-11-16 | End: 2023-11-16

## 2023-11-16 RX ORDER — TRAZODONE HYDROCHLORIDE 100 MG/1
100 TABLET ORAL NIGHTLY
Status: DISCONTINUED | OUTPATIENT
Start: 2023-11-16 | End: 2023-11-19 | Stop reason: HOSPADM

## 2023-11-16 RX ORDER — TALC
6 POWDER (GRAM) TOPICAL NIGHTLY PRN
Status: DISCONTINUED | OUTPATIENT
Start: 2023-11-16 | End: 2023-11-19 | Stop reason: HOSPADM

## 2023-11-16 RX ORDER — ONDANSETRON 8 MG/1
8 TABLET, ORALLY DISINTEGRATING ORAL EVERY 8 HOURS PRN
Status: DISCONTINUED | OUTPATIENT
Start: 2023-11-16 | End: 2023-11-17

## 2023-11-16 RX ORDER — ACETAMINOPHEN 325 MG/1
650 TABLET ORAL EVERY 4 HOURS PRN
Status: DISCONTINUED | OUTPATIENT
Start: 2023-11-16 | End: 2023-11-17

## 2023-11-16 RX ORDER — CEPHALEXIN 500 MG/1
500 CAPSULE ORAL EVERY 8 HOURS
Qty: 15 CAPSULE | Refills: 0 | Status: SHIPPED | OUTPATIENT
Start: 2023-11-16 | End: 2023-11-19 | Stop reason: HOSPADM

## 2023-11-16 RX ORDER — CARVEDILOL 6.25 MG/1
6.25 TABLET ORAL 2 TIMES DAILY WITH MEALS
Status: DISCONTINUED | OUTPATIENT
Start: 2023-11-17 | End: 2023-11-16

## 2023-11-16 RX ORDER — SODIUM CHLORIDE 9 MG/ML
INJECTION, SOLUTION INTRAVENOUS CONTINUOUS
Status: DISCONTINUED | OUTPATIENT
Start: 2023-11-16 | End: 2023-11-17

## 2023-11-16 RX ORDER — DORZOLAMIDE HCL 20 MG/ML
1 SOLUTION/ DROPS OPHTHALMIC 3 TIMES DAILY
Status: DISCONTINUED | OUTPATIENT
Start: 2023-11-16 | End: 2023-11-19 | Stop reason: HOSPADM

## 2023-11-16 RX ORDER — LATANOPROST 50 UG/ML
1 SOLUTION/ DROPS OPHTHALMIC NIGHTLY
Status: DISCONTINUED | OUTPATIENT
Start: 2023-11-16 | End: 2023-11-19 | Stop reason: HOSPADM

## 2023-11-16 RX ORDER — MIRTAZAPINE 15 MG/1
15 TABLET, FILM COATED ORAL NIGHTLY
Status: DISCONTINUED | OUTPATIENT
Start: 2023-11-16 | End: 2023-11-19 | Stop reason: HOSPADM

## 2023-11-16 RX ORDER — VENLAFAXINE HYDROCHLORIDE 75 MG/1
150 CAPSULE, EXTENDED RELEASE ORAL DAILY
Status: DISCONTINUED | OUTPATIENT
Start: 2023-11-17 | End: 2023-11-19 | Stop reason: HOSPADM

## 2023-11-16 RX ORDER — MORPHINE SULFATE 4 MG/ML
4 INJECTION, SOLUTION INTRAMUSCULAR; INTRAVENOUS
Status: COMPLETED | OUTPATIENT
Start: 2023-11-16 | End: 2023-11-16

## 2023-11-16 RX ORDER — POLYETHYLENE GLYCOL 3350 17 G/17G
17 POWDER, FOR SOLUTION ORAL DAILY
Qty: 100 EACH | Refills: 0 | Status: SHIPPED | OUTPATIENT
Start: 2023-11-16

## 2023-11-16 RX ORDER — SYRING-NEEDL,DISP,INSUL,0.3 ML 29 G X1/2"
296 SYRINGE, EMPTY DISPOSABLE MISCELLANEOUS
Status: COMPLETED | OUTPATIENT
Start: 2023-11-16 | End: 2023-11-16

## 2023-11-16 RX ORDER — HYDROCODONE BITARTRATE AND ACETAMINOPHEN 5; 325 MG/1; MG/1
1 TABLET ORAL EVERY 4 HOURS PRN
Status: DISCONTINUED | OUTPATIENT
Start: 2023-11-16 | End: 2023-11-17

## 2023-11-16 RX ADMIN — SODIUM CHLORIDE, SODIUM LACTATE, POTASSIUM CHLORIDE, AND CALCIUM CHLORIDE 500 ML: .6; .31; .03; .02 INJECTION, SOLUTION INTRAVENOUS at 08:11

## 2023-11-16 RX ADMIN — GLYCOPYRROLATE 1 MG: 1 TABLET ORAL at 10:11

## 2023-11-16 RX ADMIN — INSULIN DETEMIR 10 UNITS: 100 INJECTION, SOLUTION SUBCUTANEOUS at 10:11

## 2023-11-16 RX ADMIN — VANCOMYCIN HYDROCHLORIDE 2000 MG: 500 INJECTION, POWDER, LYOPHILIZED, FOR SOLUTION INTRAVENOUS at 09:11

## 2023-11-16 RX ADMIN — CEFTRIAXONE SODIUM 1 G: 1 INJECTION, POWDER, FOR SOLUTION INTRAMUSCULAR; INTRAVENOUS at 05:11

## 2023-11-16 RX ADMIN — LATANOPROST 1 DROP: 50 SOLUTION OPHTHALMIC at 10:11

## 2023-11-16 RX ADMIN — BE HEALTH MAGNESIUM CITRATE ORAL SOLUTION - CHERRY 296 ML: 1.75 LIQUID ORAL at 04:11

## 2023-11-16 RX ADMIN — SODIUM CHLORIDE 500 ML: 0.9 INJECTION, SOLUTION INTRAVENOUS at 07:11

## 2023-11-16 RX ADMIN — INSULIN ASPART 1 UNITS: 100 INJECTION, SOLUTION INTRAVENOUS; SUBCUTANEOUS at 11:11

## 2023-11-16 RX ADMIN — SODIUM CHLORIDE: 9 INJECTION, SOLUTION INTRAVENOUS at 09:11

## 2023-11-16 RX ADMIN — ENOXAPARIN SODIUM 40 MG: 40 INJECTION SUBCUTANEOUS at 09:11

## 2023-11-16 RX ADMIN — SODIUM CHLORIDE 500 ML: 9 INJECTION, SOLUTION INTRAVENOUS at 10:11

## 2023-11-16 RX ADMIN — SODIUM CHLORIDE 500 ML: 9 INJECTION, SOLUTION INTRAVENOUS at 04:11

## 2023-11-16 RX ADMIN — MIRTAZAPINE 15 MG: 15 TABLET, FILM COATED ORAL at 10:11

## 2023-11-16 RX ADMIN — ACETAMINOPHEN 650 MG: 325 TABLET, FILM COATED ORAL at 11:11

## 2023-11-16 RX ADMIN — ACETAMINOPHEN 1000 MG: 500 TABLET, COATED ORAL at 06:11

## 2023-11-16 RX ADMIN — MEROPENEM 1 G: 1 INJECTION, POWDER, FOR SOLUTION INTRAVENOUS at 09:11

## 2023-11-16 RX ADMIN — DORZOLAMIDE HYDROCHLORIDE 1 DROP: 20 SOLUTION/ DROPS OPHTHALMIC at 11:11

## 2023-11-16 RX ADMIN — MORPHINE SULFATE 4 MG: 4 INJECTION, SOLUTION INTRAMUSCULAR; INTRAVENOUS at 03:11

## 2023-11-16 RX ADMIN — SODIUM CHLORIDE 1000 ML: 0.9 INJECTION, SOLUTION INTRAVENOUS at 02:11

## 2023-11-16 RX ADMIN — TRAZODONE HYDROCHLORIDE 100 MG: 50 TABLET ORAL at 10:11

## 2023-11-16 RX ADMIN — Medication 100 ML: at 04:11

## 2023-11-16 NOTE — FIRST PROVIDER EVALUATION
Emergency Department TeleTriage Encounter Note      CHIEF COMPLAINT    Chief Complaint   Patient presents with    Constipation     Pt reports being discharged from hospital on 11/8 and has not had a BM since. Pt reporting lower abdominal pain and lower back pain. Denies N/V. LBM 11/5.       VITAL SIGNS   Initial Vitals [11/16/23 1213]   BP Pulse Resp Temp SpO2   (!) 114/59 (!) 119 20 99 °F (37.2 °C) 99 %      MAP       --            ALLERGIES    Review of patient's allergies indicates:  No Known Allergies    PROVIDER TRIAGE NOTE  Patient presents with lower abdominal pain, back pain and no BM for 8 days. Has tried otc meds and suppository without improvement. Recently admitted for acute kidney injury      ORDERS  Labs Reviewed   HIV 1 / 2 ANTIBODY   HEPATITIS C ANTIBODY       ED Orders (720h ago, onward)      Start Ordered     Status Ordering Provider    11/16/23 1215 11/16/23 1214  HIV 1/2 Ag/Ab (4th Gen)  STAT         Ordered TIFFANIE WALL    11/16/23 1215 11/16/23 1214  Hepatitis C Antibody  STAT         Ordered TIFFANIE WALL              Virtual Visit Note: The provider triage portion of this emergency department evaluation and documentation was performed via [employer], a HIPAA-compliant telemedicine application, in concert with a tele-presenter in the room. A face to face patient evaluation with one of my colleagues will occur once the patient is placed in an emergency department room.      DISCLAIMER: This note was prepared with imagine voice recognition transcription software. Garbled syntax, mangled pronouns, and other bizarre constructions may be attributed to that software system.

## 2023-11-16 NOTE — ED PROVIDER NOTES
"Encounter Date: 11/16/2023    SCRIBE #1 NOTE: I, Donato Martina, am scribing for, and in the presence of,  Nitish Chawla MD.       History     Chief Complaint   Patient presents with    Constipation     Pt reports being discharged from hospital on 11/8 and has not had a BM since. Pt reporting lower abdominal pain and lower back pain. Denies N/V. LBM 11/5.     Time seen by provider: 2:10 PM    Chi Mckeon is a 58 y.o. male, with a PMHx of T2DM, HTN, and CHF, who presents to the ED with constipation for the past 10 days. The patient reports his last bowel movement was on 11/06 while at the hospital during admission for ROXANNE. He states he can "feel it in there" and notes accompanying lower back pain, lightheadedness, and decreased urinary output. Patient reports use of enemas and liquids with minimal relief. He notes adherence to all medications including ones given following recent discharge. This is the extent of the patient's complaints today in the Emergency Department.      The history is provided by the patient.     Review of patient's allergies indicates:  No Known Allergies  Past Medical History:   Diagnosis Date    CHF (congestive heart failure)     Diabetes mellitus     type 2    Heart failure, unspecified     Hypertension     Unspecified glaucoma      Past Surgical History:   Procedure Laterality Date    AMPUTATION Right     below the knee of r side.  also amputation of L toe     History reviewed. No pertinent family history.     Review of Systems  Constitutional-no fever  HEENT-no congestion  Eyes-no redness  Respiratory-no shortness of breath  Cardio-no chest pain  GI-no abdominal pain  Endocrine-no cold intolerance  -constipation, decreased urinary output  MSK-no myalgias, back pain  Skin-no rashes  Allergy-no environmental allergy  Neurologic-, no headache, lightheadedness  Hematology-no swollen nodes  Behavioral-no confusion   Physical Exam     Initial Vitals [11/16/23 1213]   BP Pulse Resp " Temp SpO2   (!) 114/59 (!) 119 20 99 °F (37.2 °C) 99 %      MAP       --         Physical Exam  Constitutional: chronically ill appearing 57 yo man in mild distress  Eyes: Conjunctivae normal.  ENT       Head: Normocephalic, atraumatic.       Nose: No congestion.       Mouth/Throat: Mucous membranes are moist.  Hematological/Lymphatic/Immunilogical: No cervical lymphadenopathy.  Cardiovascular: rapid rate, regular rhythm. Normal and symmetric distal pulses.  Respiratory: Normal respiratory effort. Breath sounds are normal.  Gastrointestinal: Soft, diffusely tender, no rebound no guarding,   TEVIN- hard stool,   Musculoskeletal: right sided BKA  Neurologic: Alert, oriented. Normal speech and language. No gross focal neurologic deficits are appreciated.  Skin: Skin is warm, dry. No rash noted.  Psychiatric: Mood and affect are normal.    ED Course   Critical Care    Date/Time: 11/16/2023 7:00 PM    Performed by: Nitish Chawla MD  Authorized by: Nitish Chawla MD  Direct patient critical care time: 45 minutes  Additional history critical care time: 10 minutes  Ordering / reviewing critical care time: 9 minutes  Documentation critical care time: 6 minutes  Consulting other physicians critical care time: 4 minutes  Total critical care time (exclusive of procedural time) : 74 minutes  Critical care time was exclusive of separately billable procedures and treating other patients and teaching time.  Critical care was necessary to treat or prevent imminent or life-threatening deterioration of the following conditions: sepsis, shock and dehydration.  Critical care was time spent personally by me on the following activities: blood draw for specimens, development of treatment plan with patient or surrogate, discussions with primary provider, interpretation of cardiac output measurements, evaluation of patient's response to treatment, examination of patient, obtaining history from patient or surrogate, ordering and  performing treatments and interventions, ordering and review of laboratory studies, ordering and review of radiographic studies, pulse oximetry, re-evaluation of patient's condition and review of old charts.        Labs Reviewed   CBC W/ AUTO DIFFERENTIAL - Abnormal; Notable for the following components:       Result Value    RBC 4.34 (*)     Hemoglobin 11.0 (*)     Hematocrit 34.5 (*)     MCV 80 (*)     MCH 25.3 (*)     MCHC 31.9 (*)     RDW 20.8 (*)     MPV 8.7 (*)     Immature Granulocytes 0.7 (*)     Immature Grans (Abs) 0.05 (*)     Gran % 75.6 (*)     Lymph % 15.3 (*)     All other components within normal limits    Narrative:     Release to patient->Immediate   COMPREHENSIVE METABOLIC PANEL - Abnormal; Notable for the following components:    Potassium 5.8 (*)     Chloride 113 (*)     CO2 21 (*)     Glucose 265 (*)     BUN 34 (*)     Creatinine 1.5 (*)     Calcium 10.8 (*)     Albumin 3.2 (*)     eGFR 53 (*)     All other components within normal limits    Narrative:     Release to patient->Immediate   URINALYSIS, REFLEX TO URINE CULTURE - Abnormal; Notable for the following components:    Appearance, UA Hazy (*)     Protein, UA 1+ (*)     Glucose, UA 4+ (*)     Ketones, UA Trace (*)     Leukocytes, UA 1+ (*)     All other components within normal limits    Narrative:     Specimen Source->Urine   URINALYSIS MICROSCOPIC - Abnormal; Notable for the following components:    WBC, UA 32 (*)     Bacteria Many (*)     All other components within normal limits    Narrative:     Specimen Source->Urine   PROCALCITONIN - Abnormal; Notable for the following components:    Procalcitonin 2.12 (*)     All other components within normal limits   TOXICOLOGY SCREEN, URINE, RANDOM (COMPLIANCE) - Abnormal; Notable for the following components:    Alcohol, Urine 23 (*)     THC Presumptive Positive (*)     All other components within normal limits    Narrative:     Specimen Source->Urine   POCT GLUCOSE - Abnormal; Notable for the  following components:    POCT Glucose 289 (*)     All other components within normal limits   CULTURE, URINE   CULTURE, BLOOD   CULTURE, BLOOD   HIV 1 / 2 ANTIBODY    Narrative:     Release to patient->Immediate   HEPATITIS C ANTIBODY    Narrative:     Release to patient->Immediate   CK   TOXICOLOGY SCREEN, URINE, RANDOM (COMPLIANCE)   LACTIC ACID, PLASMA   PROCALCITONIN   LACTIC ACID, PLASMA   SARS-COV-2 RDRP GENE   POCT INFLUENZA A/B MOLECULAR   ISTAT LACTATE          Imaging Results               CT Abdomen Pelvis  Without Contrast (Final result)  Result time 11/16/23 20:21:17      Final result by Gayatri Ramirez MD (11/16/23 20:21:17)                   Impression:      1. Emphysematous cystitis.  Correlate with clinical symptoms and urinalysis.  2. Scattered liquid stool seen throughout the colon which may be seen with diarrheal illness.  3. Additional findings as detailed above.  This report was flagged in Epic as abnormal.      Electronically signed by: Gayatri Ramirez MD  Date:    11/16/2023  Time:    20:21               Narrative:    EXAMINATION:  CT ABDOMEN PELVIS WITHOUT CONTRAST    CLINICAL HISTORY:  Sepsis;    TECHNIQUE:  Low dose axial images, sagittal and coronal reformations were obtained from the lung bases to the pubic symphysis.  Oral contrast was not administered.    COMPARISON:  None    FINDINGS:  The visualized portion of the heart is unremarkable.  Mild atelectasis or scarring is seen at the right lung base.  No evidence of focal consolidation or pleural effusion.  Small amount of fluid is seen in the distal visualized esophagus which may be seen with gastroesophageal reflux.    No significant hepatic abnormality seen on this noncontrast exam.  There is no intra-or extrahepatic biliary ductal dilatation.  The gallbladder is unremarkable.  The stomach, pancreas, spleen, and adrenal glands are unremarkable.    Kidneys show no evidence of stones or hydronephrosis.  Small bilateral renal cysts  are seen.  Ureters are unremarkable along their courses.  There is dependent and non dependent intramural and/or intraluminal air seen involving the urinary bladder consistent with emphysematous cystitis.  Prostate is unremarkable.    Appendix is visualized and is unremarkable.  The visualized loops of small and large bowel show no evidence of obstruction or inflammation.  Scattered liquid stool is seen in the colon.  No free air or free fluid.    Aorta tapers normally with mild to moderate atherosclerosis.    No acute osseous abnormality identified. Subcutaneous soft tissues show no significant abnormalities.                                       X-Ray Chest AP Portable (Final result)  Result time 11/16/23 19:24:23      Final result by Gayatri Ramirez MD (11/16/23 19:24:23)                   Impression:      No acute cardiopulmonary process identified.      Electronically signed by: Gayatri Ramirez MD  Date:    11/16/2023  Time:    19:24               Narrative:    EXAMINATION:  XR CHEST AP PORTABLE    CLINICAL HISTORY:  Sepsis;    TECHNIQUE:  Single frontal view of the chest was performed.    COMPARISON:  11/06/2023.    FINDINGS:  Cardiac silhouette is normal in size.  Lungs are expanded with mild elevation of the right hemidiaphragm.  No evidence of focal consolidative process, pneumothorax, or significant pleural effusion.  No acute osseous abnormality identified.                                       X-Ray Abdomen Flat And Erect (Final result)  Result time 11/16/23 14:36:32      Final result by Jesús Watson III, MD (11/16/23 14:36:32)                   Impression:      No acute process seen.      Electronically signed by: Jesús Watson MD  Date:    11/16/2023  Time:    14:36               Narrative:    EXAMINATION:  XR ABDOMEN FLAT AND ERECT    CLINICAL HISTORY:  Unspecified abdominal pain    FINDINGS:  Abdomen flat erect:    No obstruction, ileus, or perforation seen.  Bones showed DJD.  There are  fluid levels and bowel.                                       Medications   aspirin EC tablet 81 mg (has no administration in time range)   dorzolamide 2 % ophthalmic solution 1 drop (has no administration in time range)   ferrous sulfate tablet 1 each (has no administration in time range)   glycopyrrolate tablet 1 mg (1 mg Oral Given 11/16/23 2242)   latanoprost 0.005 % ophthalmic solution 1 drop (1 drop Both Eyes Given 11/16/23 2242)   pantoprazole EC tablet 40 mg (has no administration in time range)   atorvastatin tablet 80 mg (has no administration in time range)   senna tablet 1 tablet (has no administration in time range)   traZODone tablet 100 mg (100 mg Oral Given 11/16/23 2242)   venlafaxine 24 hr capsule 150 mg (has no administration in time range)   sodium chloride 0.9% flush 10 mL (has no administration in time range)   enoxaparin injection 40 mg (40 mg Subcutaneous Given 11/16/23 2132)   acetaminophen tablet 650 mg (650 mg Oral Given 11/16/23 2303)   HYDROcodone-acetaminophen 5-325 mg per tablet 1 tablet (has no administration in time range)   HYDROmorphone injection 1 mg (has no administration in time range)   polyethylene glycol packet 17 g (has no administration in time range)   ondansetron disintegrating tablet 8 mg (has no administration in time range)   ondansetron injection 4 mg (has no administration in time range)   melatonin tablet 6 mg (has no administration in time range)   mirtazapine tablet 15 mg (15 mg Oral Given 11/16/23 2243)   meropenem (MERREM) 1 g in sodium chloride 0.9 % 100 mL IVPB (MB+) (0 g Intravenous Stopped 11/16/23 2210)   vancomycin - pharmacy to dose (has no administration in time range)   0.9%  NaCl infusion ( Intravenous New Bag 11/16/23 2115)   vancomycin 2 g in dextrose 5 % 500 mL IVPB (2,000 mg Intravenous Trough Due As Scheduled Before Dose 11/18/23 2030)   NORepinephrine 4 mg in dextrose 5% 250 mL infusion (premix) (has no administration in time range)   glucose  chewable tablet 16 g (has no administration in time range)   glucose chewable tablet 24 g (has no administration in time range)   glucagon (human recombinant) injection 1 mg (has no administration in time range)   insulin detemir U-100 (Levemir) pen 10 Units (10 Units Subcutaneous Given 11/16/23 2243)   insulin aspart U-100 pen 0-5 Units (1 Units Subcutaneous Given 11/16/23 2305)   dextrose 10% bolus 125 mL 125 mL (has no administration in time range)   dextrose 10% bolus 250 mL 250 mL (has no administration in time range)   mupirocin 2 % ointment (has no administration in time range)   sodium chloride 0.9% bolus 500 mL 500 mL (0 mLs Intravenous Stopped 11/16/23 2330)   sodium chloride 0.9% bolus 1,000 mL 1,000 mL (0 mLs Intravenous Stopped 11/16/23 1508)   morphine injection 4 mg (4 mg Intravenous Given 11/16/23 1509)   glycerin 99.5% topical solution 100 mL (100 mLs Rectal Given 11/16/23 1640)     And   magnesium citrate solution 296 mL (296 mLs Rectal Given 11/16/23 1652)     And   sodium chloride 0.9% bolus 500 mL 500 mL (0 mLs Rectal Stopped 11/16/23 1752)   cefTRIAXone (ROCEPHIN) 1 g in dextrose 5 % in water (D5W) 100 mL IVPB (MB+) (0 g Intravenous Stopped 11/16/23 1735)   sodium chloride 0.9% bolus 500 mL 500 mL (0 mLs Intravenous Stopped 11/16/23 2031)   acetaminophen tablet 1,000 mg (1,000 mg Oral Given 11/16/23 1845)   lactated ringers bolus 500 mL (0 mLs Intravenous Stopped 11/16/23 2159)     Medical Decision Making  Ddx- obstruction, constipation, dehydration, ROXANNE, sepsis    Performed TEVIN, attempted disimpaction.mild hyperglycemia.   UTI evident.  Initiated treatment with rocephin IV for UTI.  IVF administered.  Following enema had relief and improvement in symptoms.   Thereafter this gentleman developed significant fever, tachycardia anr rigors with labile pressures    1810 - SEPSIS IDENTIFIED  Sepsis protocol initiated. IVF initiated, IV ABX administered.    .sep    Problems Addressed:  Abdominal pain:  complicated acute illness or injury with systemic symptoms  Constipation, unspecified constipation type: complicated acute illness or injury  Dehydration: complicated acute illness or injury  Hypotension, unspecified hypotension type: acute illness or injury that poses a threat to life or bodily functions  Sepsis, due to unspecified organism, unspecified whether acute organ dysfunction present: complicated acute illness or injury with systemic symptoms that poses a threat to life or bodily functions  Urinary tract infection without hematuria, site unspecified: complicated acute illness or injury with systemic symptoms that poses a threat to life or bodily functions    Amount and/or Complexity of Data Reviewed  Independent Historian: spouse     Details: Notes he looks unwell  External Data Reviewed: labs, radiology, ECG and notes.     Details: Recent hospitalization for hyperglycemia and ROXANNE  Labs: ordered. Decision-making details documented in ED Course.  Radiology: ordered and independent interpretation performed. Decision-making details documented in ED Course.  ECG/medicine tests: ordered and independent interpretation performed. Decision-making details documented in ED Course.    Risk  OTC drugs.  Prescription drug management.  Parenteral controlled substances.  Drug therapy requiring intensive monitoring for toxicity.  Decision regarding hospitalization.  Diagnosis or treatment significantly limited by social determinants of health.      Additional MDM:   Sepsis:   This patient does have evidence of infective focus  My overall impression is septic shock due to SBP < 90.  Source: Urinary Tract  Antibiotics given- Antibiotics     Patient Encounter Information Not Found      Latest lactate reviewed- 1.45  Organ dysfunction indicated by Acute kidney injury, Acute heart failure, and Encephalopathy    Fluid challenge Actual Body weight- Patient will receive 30ml/kg actual body weight to calculate fluid bolus for  treatment of septic shock.     Post- resuscitation assessment Yes Perfusion exam was performed within 6 hours of septic shock presentation after bolus shows Inadequate tissue perfusion assessed by non-invasive monitoring       Will Start Pressors- Levophed for MAP of 65  Source control achieved by: IV ABX             Scribe Attestation:   Scribe #1: I performed the above scribed service and the documentation accurately describes the services I performed. I attest to the accuracy of the note.    Physician Attestation for Scribe: I, nitish chawla, reviewed documentation as scribed in my presence, which is both accurate and complete.                           Clinical Impression:  Final diagnoses:  [R10.9] Abdominal pain  [N39.0] Urinary tract infection without hematuria, site unspecified (Primary)  [E86.0] Dehydration  [K59.00] Constipation, unspecified constipation type  [A41.9] Sepsis, due to unspecified organism, unspecified whether acute organ dysfunction present  [I95.9] Hypotension, unspecified hypotension type          ED Disposition Condition    Admit                 Nitish Chawla MD  11/16/23 9912

## 2023-11-16 NOTE — ED TRIAGE NOTES
"PT arrived with c/o constipation.  Pt states, "I haven't had a BM since I was discharged from the hospital on 11/8."  Pt was recently admitted to hospital for ROXANNE.  States he has mostly been in bed since being discharged.  Pt states, "I took a liquid medicine and some suppositories.  They haven't had any effect."  Pt endorses lower back pain and mid-abd pain.  +n/-v.  Pt reports urinary urgency and decreased urination.  Denies any dysuria or fever.  Pt answering questions appropriately, speaking in complete sentences, respirations even and unlabored.  Aao x 4.    "

## 2023-11-17 LAB
ALBUMIN SERPL BCP-MCNC: 2.2 G/DL (ref 3.5–5.2)
ALLENS TEST: ABNORMAL
ALLENS TEST: ABNORMAL
ALP SERPL-CCNC: 85 U/L (ref 55–135)
ALT SERPL W/O P-5'-P-CCNC: 26 U/L (ref 10–44)
ANION GAP SERPL CALC-SCNC: 8 MMOL/L (ref 8–16)
AST SERPL-CCNC: 26 U/L (ref 10–40)
BASOPHILS NFR BLD: 0 % (ref 0–1.9)
BILIRUB SERPL-MCNC: 0.3 MG/DL (ref 0.1–1)
BNP SERPL-MCNC: 805 PG/ML (ref 0–99)
BUN SERPL-MCNC: 29 MG/DL (ref 6–20)
CALCIUM SERPL-MCNC: 8.6 MG/DL (ref 8.7–10.5)
CHLORIDE SERPL-SCNC: 113 MMOL/L (ref 95–110)
CO2 SERPL-SCNC: 17 MMOL/L (ref 23–29)
CREAT SERPL-MCNC: 1.3 MG/DL (ref 0.5–1.4)
DELSYS: ABNORMAL
DELSYS: ABNORMAL
DIFFERENTIAL METHOD: ABNORMAL
EOSINOPHIL NFR BLD: 0 % (ref 0–8)
ERYTHROCYTE [DISTWIDTH] IN BLOOD BY AUTOMATED COUNT: 20.2 % (ref 11.5–14.5)
EST. GFR  (NO RACE VARIABLE): >60 ML/MIN/1.73 M^2
FIO2: 21
GLUCOSE SERPL-MCNC: 255 MG/DL (ref 70–110)
HCO3 UR-SCNC: 16.1 MMOL/L (ref 24–28)
HCO3 UR-SCNC: 17.9 MMOL/L (ref 24–28)
HCT VFR BLD AUTO: 29.5 % (ref 40–54)
HCT VFR BLD CALC: 20 %PCV (ref 36–54)
HCT VFR BLD CALC: 25 %PCV (ref 36–54)
HGB BLD-MCNC: 7 G/DL
HGB BLD-MCNC: 9 G/DL
HGB BLD-MCNC: 9.4 G/DL (ref 14–18)
HYPOCHROMIA BLD QL SMEAR: ABNORMAL
IMM GRANULOCYTES # BLD AUTO: ABNORMAL K/UL (ref 0–0.04)
IMM GRANULOCYTES NFR BLD AUTO: ABNORMAL % (ref 0–0.5)
LACTATE SERPL-SCNC: 1.4 MMOL/L (ref 0.5–2.2)
LACTATE SERPL-SCNC: 2.1 MMOL/L (ref 0.5–2.2)
LYMPHOCYTES NFR BLD: 9 % (ref 18–48)
MAGNESIUM SERPL-MCNC: 2.5 MG/DL (ref 1.6–2.6)
MCH RBC QN AUTO: 25.3 PG (ref 27–31)
MCHC RBC AUTO-ENTMCNC: 31.9 G/DL (ref 32–36)
MCV RBC AUTO: 80 FL (ref 82–98)
MODE: ABNORMAL
MODE: ABNORMAL
MONOCYTES NFR BLD: 5 % (ref 4–15)
NEUTROPHILS NFR BLD: 74 % (ref 38–73)
NEUTS BAND NFR BLD MANUAL: 12 %
NRBC BLD-RTO: 0 /100 WBC
OVALOCYTES BLD QL SMEAR: ABNORMAL
PCO2 BLDA: 29.8 MMHG (ref 35–45)
PCO2 BLDA: 58.2 MMHG (ref 35–45)
PH SMN: 7.05 [PH] (ref 7.35–7.45)
PH SMN: 7.39 [PH] (ref 7.35–7.45)
PHOSPHATE SERPL-MCNC: 2 MG/DL (ref 2.7–4.5)
PLATELET # BLD AUTO: 268 K/UL (ref 150–450)
PLATELET BLD QL SMEAR: ABNORMAL
PMV BLD AUTO: 9 FL (ref 9.2–12.9)
PO2 BLDA: 59 MMHG (ref 40–60)
PO2 BLDA: 75 MMHG (ref 80–100)
POC BE: -14 MMOL/L
POC BE: -7 MMOL/L
POC IONIZED CALCIUM: 1.27 MMOL/L (ref 1.06–1.42)
POC SATURATED O2: 76 % (ref 95–100)
POC SATURATED O2: 95 % (ref 95–100)
POC TCO2: 18 MMOL/L (ref 24–29)
POC TCO2: 19 MMOL/L (ref 23–27)
POCT GLUCOSE: 244 MG/DL (ref 70–110)
POCT GLUCOSE: 251 MG/DL (ref 70–110)
POCT GLUCOSE: 267 MG/DL (ref 70–110)
POCT GLUCOSE: 275 MG/DL (ref 70–110)
POCT GLUCOSE: 353 MG/DL (ref 70–110)
POCT GLUCOSE: 379 MG/DL (ref 70–110)
POCT GLUCOSE: 397 MG/DL (ref 70–110)
POLYCHROMASIA BLD QL SMEAR: ABNORMAL
POTASSIUM BLD-SCNC: 5 MMOL/L (ref 3.5–5.1)
POTASSIUM SERPL-SCNC: 4.9 MMOL/L (ref 3.5–5.1)
PROCALCITONIN SERPL IA-MCNC: 10.07 NG/ML
PROT SERPL-MCNC: 6 G/DL (ref 6–8.4)
RBC # BLD AUTO: 3.71 M/UL (ref 4.6–6.2)
SAMPLE: ABNORMAL
SAMPLE: ABNORMAL
SITE: ABNORMAL
SITE: ABNORMAL
SODIUM BLD-SCNC: 140 MMOL/L (ref 136–145)
SODIUM SERPL-SCNC: 138 MMOL/L (ref 136–145)
TOXIC GRANULES BLD QL SMEAR: PRESENT
WBC # BLD AUTO: 13.63 K/UL (ref 3.9–12.7)

## 2023-11-17 PROCEDURE — 83735 ASSAY OF MAGNESIUM: CPT | Performed by: NURSE PRACTITIONER

## 2023-11-17 PROCEDURE — 80053 COMPREHEN METABOLIC PANEL: CPT | Performed by: NURSE PRACTITIONER

## 2023-11-17 PROCEDURE — 82330 ASSAY OF CALCIUM: CPT

## 2023-11-17 PROCEDURE — 20000000 HC ICU ROOM

## 2023-11-17 PROCEDURE — 84100 ASSAY OF PHOSPHORUS: CPT | Performed by: NURSE PRACTITIONER

## 2023-11-17 PROCEDURE — 25000003 PHARM REV CODE 250: Performed by: NURSE PRACTITIONER

## 2023-11-17 PROCEDURE — 99223 PR INITIAL HOSPITAL CARE,LEVL III: ICD-10-PCS | Mod: ,,, | Performed by: SURGERY

## 2023-11-17 PROCEDURE — 85027 COMPLETE CBC AUTOMATED: CPT | Performed by: NURSE PRACTITIONER

## 2023-11-17 PROCEDURE — 25000003 PHARM REV CODE 250: Performed by: HOSPITALIST

## 2023-11-17 PROCEDURE — 94761 N-INVAS EAR/PLS OXIMETRY MLT: CPT | Mod: XB

## 2023-11-17 PROCEDURE — 84295 ASSAY OF SERUM SODIUM: CPT

## 2023-11-17 PROCEDURE — 99223 1ST HOSP IP/OBS HIGH 75: CPT | Mod: ,,, | Performed by: SURGERY

## 2023-11-17 PROCEDURE — 99900035 HC TECH TIME PER 15 MIN (STAT)

## 2023-11-17 PROCEDURE — 63600175 PHARM REV CODE 636 W HCPCS: Performed by: NURSE PRACTITIONER

## 2023-11-17 PROCEDURE — 82803 BLOOD GASES ANY COMBINATION: CPT

## 2023-11-17 PROCEDURE — 36600 WITHDRAWAL OF ARTERIAL BLOOD: CPT

## 2023-11-17 PROCEDURE — 63600175 PHARM REV CODE 636 W HCPCS: Performed by: HOSPITALIST

## 2023-11-17 PROCEDURE — 99235 PR OBSERV/HOSP SAME DATE,LEVL IV: ICD-10-PCS | Mod: GC,,, | Performed by: STUDENT IN AN ORGANIZED HEALTH CARE EDUCATION/TRAINING PROGRAM

## 2023-11-17 PROCEDURE — 99235 HOSP IP/OBS SAME DATE MOD 70: CPT | Mod: GC,,, | Performed by: STUDENT IN AN ORGANIZED HEALTH CARE EDUCATION/TRAINING PROGRAM

## 2023-11-17 PROCEDURE — 83880 ASSAY OF NATRIURETIC PEPTIDE: CPT | Performed by: NURSE PRACTITIONER

## 2023-11-17 PROCEDURE — 51702 INSERT TEMP BLADDER CATH: CPT

## 2023-11-17 PROCEDURE — 85007 BL SMEAR W/DIFF WBC COUNT: CPT | Performed by: NURSE PRACTITIONER

## 2023-11-17 PROCEDURE — 83605 ASSAY OF LACTIC ACID: CPT | Performed by: NURSE PRACTITIONER

## 2023-11-17 PROCEDURE — 85014 HEMATOCRIT: CPT

## 2023-11-17 PROCEDURE — 84132 ASSAY OF SERUM POTASSIUM: CPT

## 2023-11-17 RX ORDER — ACETAMINOPHEN 500 MG
1000 TABLET ORAL EVERY 8 HOURS PRN
Status: DISCONTINUED | OUTPATIENT
Start: 2023-11-17 | End: 2023-11-19 | Stop reason: HOSPADM

## 2023-11-17 RX ORDER — OXYCODONE HYDROCHLORIDE 5 MG/1
5 TABLET ORAL EVERY 4 HOURS PRN
Status: DISCONTINUED | OUTPATIENT
Start: 2023-11-17 | End: 2023-11-19 | Stop reason: HOSPADM

## 2023-11-17 RX ORDER — OXYCODONE HYDROCHLORIDE 5 MG/1
5 TABLET ORAL EVERY 4 HOURS PRN
Status: DISCONTINUED | OUTPATIENT
Start: 2023-11-17 | End: 2023-11-17

## 2023-11-17 RX ORDER — INSULIN ASPART 100 [IU]/ML
5 INJECTION, SOLUTION INTRAVENOUS; SUBCUTANEOUS
Status: DISCONTINUED | OUTPATIENT
Start: 2023-11-17 | End: 2023-11-17

## 2023-11-17 RX ORDER — INSULIN ASPART 100 [IU]/ML
5 INJECTION, SOLUTION INTRAVENOUS; SUBCUTANEOUS
Status: DISCONTINUED | OUTPATIENT
Start: 2023-11-17 | End: 2023-11-18

## 2023-11-17 RX ADMIN — ATORVASTATIN CALCIUM 80 MG: 20 TABLET, FILM COATED ORAL at 10:11

## 2023-11-17 RX ADMIN — MIRTAZAPINE 15 MG: 15 TABLET, FILM COATED ORAL at 09:11

## 2023-11-17 RX ADMIN — INSULIN DETEMIR 20 UNITS: 100 INJECTION, SOLUTION SUBCUTANEOUS at 10:11

## 2023-11-17 RX ADMIN — INSULIN ASPART 5 UNITS: 100 INJECTION, SOLUTION INTRAVENOUS; SUBCUTANEOUS at 12:11

## 2023-11-17 RX ADMIN — DORZOLAMIDE HYDROCHLORIDE 1 DROP: 20 SOLUTION/ DROPS OPHTHALMIC at 10:11

## 2023-11-17 RX ADMIN — GLYCOPYRROLATE 1 MG: 1 TABLET ORAL at 09:11

## 2023-11-17 RX ADMIN — MEROPENEM 1 G: 1 INJECTION, POWDER, FOR SOLUTION INTRAVENOUS at 04:11

## 2023-11-17 RX ADMIN — LATANOPROST 1 DROP: 50 SOLUTION OPHTHALMIC at 09:11

## 2023-11-17 RX ADMIN — MUPIROCIN: 20 OINTMENT TOPICAL at 09:11

## 2023-11-17 RX ADMIN — ASPIRIN 81 MG: 81 TABLET, COATED ORAL at 10:11

## 2023-11-17 RX ADMIN — TRAZODONE HYDROCHLORIDE 100 MG: 50 TABLET ORAL at 09:11

## 2023-11-17 RX ADMIN — MUPIROCIN: 20 OINTMENT TOPICAL at 10:11

## 2023-11-17 RX ADMIN — GLYCOPYRROLATE 1 MG: 1 TABLET ORAL at 12:11

## 2023-11-17 RX ADMIN — INSULIN ASPART 3 UNITS: 100 INJECTION, SOLUTION INTRAVENOUS; SUBCUTANEOUS at 09:11

## 2023-11-17 RX ADMIN — INSULIN ASPART 5 UNITS: 100 INJECTION, SOLUTION INTRAVENOUS; SUBCUTANEOUS at 06:11

## 2023-11-17 RX ADMIN — INSULIN ASPART 2 UNITS: 100 INJECTION, SOLUTION INTRAVENOUS; SUBCUTANEOUS at 07:11

## 2023-11-17 RX ADMIN — DORZOLAMIDE HYDROCHLORIDE 1 DROP: 20 SOLUTION/ DROPS OPHTHALMIC at 09:11

## 2023-11-17 RX ADMIN — SODIUM CHLORIDE: 9 INJECTION, SOLUTION INTRAVENOUS at 04:11

## 2023-11-17 RX ADMIN — FERROUS SULFATE TAB 325 MG (65 MG ELEMENTAL FE) 1 EACH: 325 (65 FE) TAB at 10:11

## 2023-11-17 RX ADMIN — VENLAFAXINE HYDROCHLORIDE 150 MG: 75 CAPSULE, EXTENDED RELEASE ORAL at 10:11

## 2023-11-17 RX ADMIN — VANCOMYCIN HYDROCHLORIDE 2000 MG: 500 INJECTION, POWDER, LYOPHILIZED, FOR SOLUTION INTRAVENOUS at 09:11

## 2023-11-17 RX ADMIN — MEROPENEM 1 G: 1 INJECTION, POWDER, FOR SOLUTION INTRAVENOUS at 02:11

## 2023-11-17 RX ADMIN — OXYCODONE HYDROCHLORIDE 5 MG: 5 TABLET ORAL at 10:11

## 2023-11-17 RX ADMIN — INSULIN DETEMIR 20 UNITS: 100 INJECTION, SOLUTION SUBCUTANEOUS at 09:11

## 2023-11-17 RX ADMIN — INSULIN ASPART 5 UNITS: 100 INJECTION, SOLUTION INTRAVENOUS; SUBCUTANEOUS at 10:11

## 2023-11-17 RX ADMIN — ENOXAPARIN SODIUM 40 MG: 40 INJECTION SUBCUTANEOUS at 05:11

## 2023-11-17 RX ADMIN — DORZOLAMIDE HYDROCHLORIDE 1 DROP: 20 SOLUTION/ DROPS OPHTHALMIC at 04:11

## 2023-11-17 RX ADMIN — MEROPENEM 1 G: 1 INJECTION, POWDER, FOR SOLUTION INTRAVENOUS at 09:11

## 2023-11-17 NOTE — PLAN OF CARE
Recommendation/Intervention:   1) Recommend Boost Glucose BID to promote PO intake   2) Recommend Greg BID to promote wound healing   3) Monitor wt to ensure wt maintenance   4) RD to follow to monitor nutrition intake     Goals: Pt will have stable glucose levels by next RD follow-up  Nutrition Goal Status: new  Communication of RD Recs: other (comment) (POC)

## 2023-11-17 NOTE — PLAN OF CARE
MSW met with the patient at the bedside.     Patient is alert and oriented with no communication barriers.     Patient reported having DME at home. ([employer])     Patients PCP is correct on the face sheet. Patient choice pharmacy is bedside/[facility].     Patients' family will transport the patient home at discharge.     CM team will continue to follow.      11/17/23 0858   Discharge Assessment   Assessment Type Discharge Planning Assessment   Confirmed/corrected address, phone number and insurance Yes   Confirmed Demographics Correct on Facesheet   Source of Information patient;health record   People in Home significant other   Do you expect to return to your current living situation? Yes   Prior to hospitilization cognitive status: Alert/Oriented   Current cognitive status: Alert/Oriented   Equipment Currently Used at Home cane, quad;other (see comments)  (Electric Scooter)   Readmission within 30 days? No   Patient currently being followed by outpatient case management? No   Do you currently have service(s) that help you manage your care at home? Yes   Is the pt/caregiver preference to resume services with current agency No   Do you take prescription medications? Yes   Do you have prescription coverage? Yes   Do you have any problems affording any of your prescribed medications? No   Is the patient taking medications as prescribed? yes   How do you get to doctors appointments? family or friend will provide   Are you on dialysis? No   Do you take coumadin? No   DME Needed Upon Discharge  none   Discharge Plan discussed with: Patient   Discharge Plan A Home;Home with family

## 2023-11-17 NOTE — PROGRESS NOTES
"[facility] Intensive Care [facility] Medicine  Progress Note    Patient Name: Chi Mckeon  MRN: [id number]  Patient Class: IP- Inpatient   Admission Date: 11/16/2023  Length of Stay: 1 days  Attending Physician: Jony Villarreal MD  Primary Care Provider: Kip Devine MD        Subjective:     Principal Problem:Severe sepsis        HPI:  Per Bouchra Varma, NP:    "Mr. Mckeon is a 58 YOM with PMHx of combined systolic and diastolic CHF, CAD (RCA ), HTN, HLD, DM type II, PVD, s/p R BKA with chronic stump pain, GOMEZ, former smoker, and MDD/anxiety.     He presents to ED due to complaints of constipation, urinary frequency, decreased UOP, diffuse abdominal pain, chills/rigors, decreased appetite, decreased PO intake, N/V, HA, light headiness, dizziness, malaise, and generally feeling unwell. He notes constipation since 11/6 and other complaints have been present for a few days now. He reports malaise has been so significant he has been mostly bed bound this week. He denies SOB, SINHA, CP, diarrhea, constipation, seizures, or syncope. He notes compliance with home medications. He lives with spouse, is independent in ADLs, and uses prosthesis and can for ambulation. Of note he was recently admitted 11/6-8 due to hyperglycemia and ROXANNE.     In the ED initially he was slightly hypotensive, -120, and afebrile and received brown bomb enema with excellent result. He was given rocephin for concern of developing UTI based on UA at which time he became febrile with TMAX 100.7, tachycardic with -150's, and worsening hypotension. He was initiated on sepsis protocol with IVF hydration and improvement in HR and BP.     Initial workup up noted CBC with WBC 7, stable H/H. Chemistry with BUN 34, SCr 1.5 (baseline 1-1.2), glucose 265, anion gap 8. LFTs WNL. Lactic acid 1.46. CPK 43. Procalcitonin 2.12. UA concerning for infection. Hep C and HIV non reactive. Flu and Covid negative. Urine toxicology screen + " "for marijuana. EKG with ST. KUB without acute finding. CXR without acute cardiopulmonary process. CT A/P noted emphysematous cystitis and scattered liquid stool seen throughout the colon. Blood cultures and urine culture in process.     The patient was admitted to the Hospital Medicine Service for further evaluation and management."    Overview/Hospital Course:  Patient is a 58-year-old man with poorly controlled diabetes, chronic diastolic and systolic heart failure, peripheral vascular disease status post prior right below-the-knee amputation, prior tobacco smoker admitted the hospital with evidence of sepsis secondary urinary tract infection.    Patient volume resuscitated and started on broad-spectrum antibiotic therapy.  Patient also wound proximal to his right stump which has been present for over a month following prior fall for which she is receiving wound care.  Wound foul-smelling.  Surgery consult recommended further wound care and chemical debridement.    Interval History: Patient reports some dysuria but improving.  Off vasopressors this morning.    Review of Systems   Constitutional:  Negative for chills and fever.   Respiratory:  Negative for shortness of breath.    Cardiovascular:  Negative for chest pain.   Gastrointestinal:  Negative for abdominal pain, constipation, diarrhea, nausea and vomiting.   Genitourinary:  Positive for dysuria.     Objective:     Vital Signs (Most Recent):  Temp: 98.7 °F (37.1 °C) (11/17/23 1100)  Pulse: 93 (11/17/23 1400)  Resp: (!) 22 (11/17/23 1400)  BP: (!) 114/57 (11/17/23 1400)  SpO2: 96 % (11/17/23 1400) Vital Signs (24h Range):  Temp:  [97.9 °F (36.6 °C)-100.8 °F (38.2 °C)] 98.7 °F (37.1 °C)  Pulse:  [] 93  Resp:  [10-44] 22  SpO2:  [87 %-100 %] 96 %  BP: ()/(43-85) 114/57     Weight: 88.5 kg (195 lb 1.7 oz)  Body mass index is 24.39 kg/m².    Intake/Output Summary (Last 24 hours) at 11/17/2023 1450  Last data filed at 11/17/2023 1416  Gross per 24 hour "   Intake 2599 ml   Output 2305 ml   Net 294 ml         Physical Exam  Cardiovascular:      Rate and Rhythm: Normal rate and regular rhythm.      Heart sounds: Normal heart sounds. No murmur heard.     No friction rub. No gallop.   Pulmonary:      Effort: Pulmonary effort is normal. No respiratory distress.      Breath sounds: Normal breath sounds. No wheezing or rales.   Abdominal:      General: Bowel sounds are normal. There is no distension.      Palpations: Abdomen is soft.      Tenderness: There is no abdominal tenderness.   Musculoskeletal:         General: No tenderness. Normal range of motion.   Skin:     General: Skin is warm and dry.      Coloration: Skin is not pale.      Findings: No erythema or rash.      Comments: Wounds over his right below-the-knee amputation stump, foul-smelling but without induration   Neurological:      Mental Status: He is alert and oriented to person, place, and time.             Significant Labs: All pertinent labs within the past 24 hours have been reviewed.    Significant Imaging: I have reviewed all pertinent imaging results/findings within the past 24 hours.      Assessment/Plan:      * Severe sepsis  Improving with broad-spectrum antibiotics.  Will continue.  Suspect urinary tract infection most likely source of infection.  Wound appear subacute/chronic in nature.  May benefit from surgical debridement however surgery evaluated feels patient can continue local wound care and chemical debridement for now.    Chronic combined systolic and diastolic heart failure  Volume status okay right now.  Hold off further intravenous fluids.    Type 2 diabetes mellitus with hyperglycemia, with long-term current use of insulin  Poorly controlled.  Increase both long and short-acting insulin this morning.    S/P unilateral BKA (below knee amputation)  -history of  -chronic pain in stump  -PRN supportive care as needed  -fall precautions    Hypertension  Holding/reducing blood pressure  medication due to sepsis and relative hypotension.  Blood pressures remained soft however has not need vasopressors today.  Monitor.    Iron deficiency anemia due to chronic blood loss  -chronic, H/H stable  -no s/s of bleeding at current  -continue home iron supplement  -trend labs, transfuse as indicated   -monitor     Atherosclerosis of coronary artery   of RCA  HLD  -chronic, no s/s of ACS at current  -continue home ASA and statin  -EKG PRN concerns  -monitor     Adjustment disorder with mixed anxiety and depressed mood  -chronic  -controlled  -continue home mirtazapine, trazodone, and effexor  -monitor     Acute kidney injury  Secondary to sepsis.  Clinically improving following volume resuscitation.      VTE Risk Mitigation (From admission, onward)           Ordered     enoxaparin injection 40 mg  Daily         11/16/23 2023     IP VTE HIGH RISK PATIENT  Once         11/16/23 2023     Place sequential compression device  Until discontinued         11/16/23 2023                    Jony Villarreal MD  Department of Hospital Medicine   [facility], [place], operated by [facility] - Intensive Care ([place])

## 2023-11-17 NOTE — CONSULTS
[facility] Intensive Care [facility]  Wound Care    Patient Name:  Chi Mckeon   MRN:  [id number]  Date: 11/17/2023  Diagnosis: Severe sepsis    History:     Past Medical History:   Diagnosis Date    CHF (congestive heart failure)     Diabetes mellitus     type 2    Heart failure, unspecified     Hypertension     Unspecified glaucoma        Social History     Socioeconomic History    Marital status:        Precautions:     Allergies as of 11/16/2023    (No Known Allergies)       [facility] Assessment Details/Treatment     Wound care consult consult received for assessment of right knee. Patient is a 58 year old male known to wound care team from recent admission. Past medical history of CHF, CAD, HTN, DM, PVD s/p R BKA who presents with constipation after recent discharge on 11/8. He was noted to be hypotensive and tachycardic in the ED. Was treated for UTI based on UA. CT consistent with emphysematous cystitis. Sepsis protocol initiated and patient admitted to ICU for treatment. General surgery consulted for wound of right stump BKA. Patient reports that wound has been present for a few weeks.     Upon assessment to right knee noted present on admit stage 3 pressure injuries from prosthesis that patient states have been slow healing. Right knee with two full thickness wounds with dry fibrinous wound bed. Cleansed wounds with Vashe and applied Medihoney to open wounds and covered with silicone foam dressing.      Recommend daily santyl ointment application to right knee wounds to provide enzymatic debridement and moist wound healing. Nursing and MD team notified. Orders placed. Nursing to maintain pressure injury prevention interventions. Wound care to follow pt.        11/17/23 1203        Altered Skin Integrity 11/06/23 2251 Right anterior Knee #1 Ulceration Full thickness tissue loss. Subcutaneous fat may be visible but bone, tendon or muscle are not exposed   Date First Assessed/Time First Assessed: 11/06/23  2251   Altered Skin Integrity Present on Admission - Did Patient arrive to the hospital with altered skin?: yes  Side: Right  Orientation: anterior  Location: Knee  Wound Number: #1  Primary Wound Type...   Wound Image    Dressing Appearance Open to air   Drainage Amount None   Drainage Characteristics/Odor No odor   Appearance Red;Yellow;Dry   Tissue loss description Full thickness   Periwound Area Intact;Dry   Wound Length (cm) 3 cm   Wound Width (cm) 3 cm   Wound Depth (cm) 0.2 cm  (distal ulcer: 1.5 x 1.2 cm)   Wound Volume (cm^3) 1.8 cm^3   Wound Surface Area (cm^2) 9 cm^2   Care Cleansed with:;Antimicrobial agent   Dressing Applied;Honey;Silicone;Foam   Dressing Change Due 11/18/23 11/17/2023

## 2023-11-17 NOTE — ASSESSMENT & PLAN NOTE
of RCA  HLD  -chronic, no s/s of ACS at current  -continue home ASA and statin  -EKG PRN concerns  -monitor

## 2023-11-17 NOTE — PROGRESS NOTES
[facility] Intensive Care [facility])  Adult Nutrition  Progress Note    SUMMARY       Recommendations    Recommendation/Intervention:   1) Recommend Boost Glucose BID to promote PO intake   2) Recommend Greg BID to promote wound healing   3) Monitor wt to ensure wt maintenance   4) RD to follow to monitor nutrition intake    Goals: Pt will have stable glucose levels by next RD follow-up  Nutrition Goal Status: new  Communication of RD Recs: other (comment) (POC)    Assessment and Plan  Nutrition Problem  Excessive carbohydrate intake    Related to (etiology):   T2DM with hyperglycemia dx    Signs and Symptoms (as evidenced by):   Hx below the knee amputation 2/2 diabetic neuropathy  [id number]=59.9 in September 2023 consistent with poorly controlled DM    Interventions/Recommendations (treatment strategy):  Consistent carbohydrate diet  Collaboration with other providers    Nutrition Diagnosis Status:   Continues        Malnutrition Assessment                 Orbital Region (Subcutaneous Fat Loss): well nourished  Upper Arm Region (Subcutaneous Fat Loss): mild depletion   Restorationist Region (Muscle Loss): well nourished  Clavicle Bone Region (Muscle Loss): well nourished  Clavicle and Acromion Bone Region (Muscle Loss): well nourished  Scapular Bone Region (Muscle Loss): well nourished  Posterior Calf Region (Muscle Loss): moderate depletion                 Reason for Assessment    Reason For Assessment: identified at risk by screening criteria  Diagnosis:  (severe sepsis)  Relevant Medical History:   Patient Active Problem List   Diagnosis    Acute kidney injury    Acquired absence of right leg below knee    Adjustment disorder with mixed anxiety and depressed mood    Atherosclerosis of coronary artery    Cigarette smoker    Depression    Diabetic peripheral neuropathy associated with type 2 diabetes mellitus    Former smoker    Generalized ischemic myocardial dysfunction    HLD (hyperlipidemia)    Hypertensive retinopathy  "of both eyes, grade 1    Iron deficiency anemia due to chronic blood loss    Major depressive disorder, recurrent episode, mild    Microcytic anemia    Mild nonproliferative diabetic retinopathy of left eye without macular edema associated with type 2 diabetes mellitus    Multiple thyroid nodules    Primary hypertension    Primary open angle glaucoma (POAG)    S/P unilateral BKA (below knee amputation)    Type 2 diabetes mellitus with hyperglycemia, with long-term current use of insulin    Chronic combined systolic and diastolic heart failure    Hyperglycemia due to diabetes mellitus    Severe sepsis    Emphysematous cystitis    Abdominal pain    Constipation    Chronic total occlusion of native coronary artery      Interdisciplinary Rounds: did not attend  General Information Comments: Pt is currently on a diabetic 2000 kcal diet. Pt reports a good appetite and ate 100% of his breakfast of grits, eggs, and sausage this am. No N/V/C, no problems C/S, NKFA or diet restrictions. Pt reports diarrhea and slight abdominal pain. UBW: 232 lbs per pt, currently 195 lbs. Pt self-reported wt loss d/t decreased appetite. Pt weighed 240 lbs on 10/2022. PIV. Mahin 18 - altered skin integrity on right anterior knee. NFPE - mild fat loss of upper arm region and moderate muscle loss of posterior calf region.  Nutrition Discharge Planning: d/c on diabetic diet    Nutrition Risk Screen    Nutrition Risk Screen: reduced oral intake over the last month, large or nonhealing wound, burn or pressure injury    Nutrition/Diet History    Food Allergies: NKFA  Factors Affecting Nutritional Intake: diarrhea, decreased appetite, abdominal pain    Anthropometrics    Temp: 98.7 °F (37.1 °C)  Height Method: Stated  Height: 6' 3" (190.5 cm)  Height (inches): 75 in  Weight Method: Bed Scale  Weight: 88.5 kg (195 lb 1.7 oz)  Weight (lb): 195.11 lb  Ideal Body Weight (IBW), Male: 196 lb  % Ideal Body Weight, Male (lb): 99.55 %  BMI (Calculated): " 24.4  BMI Grade: 18.5-24.9 - normal  Usual Body Weight (UBW), k.2 kg (~1 year ago)  % Usual Body Weight: 84.3  % Weight Change From Usual Weight: -15.88 %  Amputation %: 5.9%  Total Amputation %: 5.9  Amputation Ideal Body Weight (IBW), Male (lb): 190.1 lb  Amputee BMI (kg/m2): 26.37 kg/m2       Lab/Procedures/Meds    Pertinent Labs Reviewed: reviewed  CBC:  Recent Labs   Lab 23  0355 23  0400 23  0443   WBC 13.63*  --   --    HGB 9.4*  --   --    HCT 29.5*   < > 25*     --   --     < > = values in this interval not displayed.     CMP:  Recent Labs   Lab 23  0355   CALCIUM 8.6*   ALBUMIN 2.2*   PROT 6.0      K 4.9   CO2 17*   *   BUN 29*   CREATININE 1.3   ALKPHOS 85   ALT 26   AST 26   BILITOT 0.3     Pertinent Medications Reviewed: reviewed  Scheduled Meds:   aspirin  81 mg Oral Daily    atorvastatin  80 mg Oral Daily    [START ON 2023] collagenase   Topical (Top) Daily    dorzolamide  1 drop Both Eyes TID    enoxparin  40 mg Subcutaneous Daily    ferrous sulfate  1 tablet Oral Every other day    glycopyrrolate  1 mg Oral BID    insulin aspart U-100  5 Units Subcutaneous TIDWM    insulin detemir U-100  20 Units Subcutaneous BID    latanoprost  1 drop Both Eyes QHS    meropenem (MERREM) IVPB  1 g Intravenous Q8H    mirtazapine  15 mg Oral QHS    mupirocin   Nasal BID    pantoprazole  40 mg Oral QAM    senna  1 tablet Oral Daily    traZODone  100 mg Oral Nightly    vancomycin (VANCOCIN) IV (PEDS and ADULTS)  2,000 mg Intravenous Q24H    venlafaxine  150 mg Oral Daily     Continuous Infusions:  PRN Meds:.acetaminophen, dextrose 10%, dextrose 10%, glucagon (human recombinant), glucose, glucose, insulin aspart U-100, melatonin, ondansetron, oxyCODONE, polyethylene glycol, sodium chloride 0.9%, Pharmacy to dose Vancomycin consult **AND** vancomycin - pharmacy to dose     Estimated/Assessed Needs    Weight Used For Calorie Calculations: 88.5 kg (195 lb 1.7  oz)  Energy Calorie Requirements (kcal): 2142 kcal  Energy Need Method: Coles-St Jeor (x1.2)  Protein Requirements: 80 g (0.9 g/kg)  Weight Used For Protein Calculations: 88.5 kg (195 lb 1.7 oz)  Fluid Requirements (mL): 1 mL/kcal  Estimated Fluid Requirement Method: other (see comments) (or per MD)  RDA Method (mL): 2142         Nutrition Prescription Ordered    Current Diet Order: Diabetic 2000 kcal diet    Evaluation of Received Nutrient/Fluid Intake    I/O: +1.8 L since admit  Energy Calories Required: other (see comments) (unable to assess - no percentage of food intake recorded)  Protein Required: other (see comments) (unable to assess - no percentage of food intake recorded)  Comments: LBM: 11/16/23  Tolerance: tolerating  % Intake of Estimated Energy Needs: Other: N/A  % Meal Intake: Other: N/A    Nutrition Risk    Level of Risk/Frequency of Follow-up: low - moderate     Monitor and Evaluation    Food and Nutrient Intake: energy intake, food and beverage intake  Food and Nutrient Adminstration: diet order  Physical Activity and Function: nutrition-related ADLs and IADLs  Anthropometric Measurements: weight, weight change  Biochemical Data, Medical Tests and Procedures: electrolyte and renal panel, gastrointestinal profile, glucose/endocrine profile, lipid profile, inflammatory profile  Nutrition-Focused Physical Findings: overall appearance, skin     Nutrition Follow-Up    RD Follow-up?: Yes    María Fagan, Dietetic Intern    Maddi Buckner, GYPSYN, LDN

## 2023-11-17 NOTE — SUBJECTIVE & OBJECTIVE
Past Medical History:   Diagnosis Date    CHF (congestive heart failure)     Diabetes mellitus     type 2    Heart failure, unspecified     Hypertension     Unspecified glaucoma        Past Surgical History:   Procedure Laterality Date    AMPUTATION Right     below the knee of r side.  also amputation of L toe       Review of patient's allergies indicates:  No Known Allergies    Family History    None         Tobacco Use    Smoking status: Not on file    Smokeless tobacco: Not on file   Substance and Sexual Activity    Alcohol use: Not on file    Drug use: Not on file    Sexual activity: Not on file       Review of Systems  See HPI  Objective:     Temp:  [97.9 °F (36.6 °C)-100.8 °F (38.2 °C)] 98.7 °F (37.1 °C)  Pulse:  [] 93  Resp:  [10-44] 22  SpO2:  [87 %-100 %] 96 %  BP: ()/(43-81) 114/57  Weight: 88.5 kg (195 lb 1.7 oz)  Body mass index is 24.39 kg/m².    Date 11/17/23 0700 - 11/18/23 0659   Shift 8657-6045 7853-7791 8519-3645 24 Hour Total   INTAKE   Shift Total(mL/kg)       OUTPUT   Urine(mL/kg/hr) 1905(2.7)   1905   Shift Total(mL/kg) 1905(21.5)   1905(21.5)   Weight (kg) 88.5 88.5 88.5 88.5     Bladder Scan Volume (mL): 270 mL (11/16/23 1431)    Drains       Drain  Duration                  Urethral Catheter 11/17/23 1308 <1 day                     Physical Exam  Constitutional:       General: He is not in acute distress.     Appearance: Normal appearance. He is not ill-appearing.   HENT:      Head: Normocephalic and atraumatic.      Mouth/Throat:      Mouth: Mucous membranes are moist.   Eyes:      Extraocular Movements: Extraocular movements intact.   Cardiovascular:      Rate and Rhythm: Normal rate.   Pulmonary:      Effort: Pulmonary effort is normal.   Abdominal:      Palpations: Abdomen is soft.   Musculoskeletal:      Cervical back: Normal range of motion.      Comments: S/p right BKA   Skin:     General: Skin is warm and dry.   Neurological:      Mental Status: He is alert and oriented  to person, place, and time.   Psychiatric:         Mood and Affect: Mood normal.         Behavior: Behavior normal.          Significant Labs:    BMP:  Recent Labs   Lab 11/16/23  1243 11/17/23  0355    138   K 5.8* 4.9   * 113*   CO2 21* 17*   BUN 34* 29*   CREATININE 1.5* 1.3   CALCIUM 10.8* 8.6*       CBC:  Recent Labs   Lab 11/16/23  1243 11/17/23  0355 11/17/23  0400 11/17/23  0443   WBC 7.25 13.63*  --   --    HGB 11.0* 9.4*  --   --    HCT 34.5* 29.5* 20* 25*    268  --   --        Urine Studies:   Recent Labs   Lab 11/16/23  1438   COLORU Yellow   APPEARANCEUA Hazy*   PHUR 6.0   SPECGRAV 1.020   PROTEINUA 1+*   GLUCUA 4+*   KETONESU Trace*   BILIRUBINUA Negative   OCCULTUA Negative   NITRITE Negative   UROBILINOGEN Negative   LEUKOCYTESUR 1+*   RBCUA 0   WBCUA 32*   BACTERIA Many*   SQUAMEPITHEL 2   HYALINECASTS 0     All pertinent labs results from the past 24 hours have been reviewed.    Significant Imaging:  All pertinent imaging results/findings from the past 24 hours have been reviewed.

## 2023-11-17 NOTE — ASSESSMENT & PLAN NOTE
Improving with broad-spectrum antibiotics.  Will continue.  Suspect urinary tract infection most likely source of infection.  Wound appear subacute/chronic in nature.  May benefit from surgical debridement however surgery evaluated feels patient can continue local wound care and chemical debridement for now.

## 2023-11-17 NOTE — ASSESSMENT & PLAN NOTE
Primary hypertension  -chronic, compensated on physical exam and BP low/normotensive  -hold home carvedilol, lasix, lisinopril, and aldactone >>> resume when clinically appropriate  -monitor for toleration of fluid resuscitation  -diuresis as needed   -monitor

## 2023-11-17 NOTE — ASSESSMENT & PLAN NOTE
Holding/reducing blood pressure medication due to sepsis and relative hypotension.  Blood pressures remained soft however has not need vasopressors today.  Monitor.

## 2023-11-17 NOTE — PROGRESS NOTES
"Pharmacokinetic Initial Assessment: IV Vancomycin    Assessment/Plan:    Initiate intravenous vancomycin with loading dose of 2000 mg once followed by a maintenance dose of vancomycin 2000mg IV every 24 hours  Desired empiric serum trough concentration is 10 to 20 mcg/mL  Draw vancomycin trough level 30 min prior to 2100 dose on 11/18 at approximately 2030  Pharmacy will continue to follow and monitor vancomycin.      Please contact pharmacy at extension [phone] with any questions regarding this assessment.     Thank you for the consult,   Zahra Issa       Patient brief summary:  Chi Mckeon is a 58 y.o. male initiated on antimicrobial therapy with IV Vancomycin for treatment of suspected  Emphysematous cystitis    Drug Allergies:   Review of patient's allergies indicates:  No Known Allergies    Actual Body Weight:   89.8kg    Renal Function:   Estimated Creatinine Clearance: 63.4 mL/min (A) (based on SCr of 1.5 mg/dL (H)).,     Dialysis Method (if applicable):  N/A    CBC (last 72 hours):  Recent Labs   Lab Result Units 11/16/23  1243   WBC K/uL 7.25   Hemoglobin g/dL 11.0*   Hematocrit % 34.5*   Platelets K/uL 346   Gran % % 75.6*   Lymph % % 15.3*   Mono % % 7.7   Eosinophil % % 0.6   Basophil % % 0.1   Differential Method  Automated       Metabolic Panel (last 72 hours):  Recent Labs   Lab Result Units 11/16/23  1243 11/16/23  1438   Sodium mmol/L 142  --    Potassium mmol/L 5.8*  --    Chloride mmol/L 113*  --    CO2 mmol/L 21*  --    Glucose mg/dL 265*  --    Glucose, UA   --  4+*   BUN mg/dL 34*  --    Creatinine mg/dL 1.5*  --    Albumin g/dL 3.2*  --    Total Bilirubin mg/dL 0.4  --    Alkaline Phosphatase U/L 89  --    AST U/L 22  --    ALT U/L 25  --        Drug levels (last 3 results):  No results for input(s): "VANCOMYCINRA", "VANCORANDOM", "VANCOMYCINPE", "VANCOPEAK", "VANCOMYCINTR", "VANCOTROUGH" in the last 72 hours.    Microbiologic Results:  Microbiology Results (last 7 days)       Procedure " Component Value Units Date/Time    Blood culture x two cultures. Draw prior to antibiotics. [[id number]] Collected: 11/16/23 2000    Order Status: Sent Specimen: Blood from Peripheral, Antecubital, Right Updated: 11/16/23 2001    Blood culture x two cultures. Draw prior to antibiotics. [[id number]] Collected: 11/16/23 2000    Order Status: Sent Specimen: Blood from Peripheral, Antecubital, Left Updated: 11/16/23 2000    Urine culture [[id number]] Collected: 11/16/23 1438    Order Status: No result Specimen: Urine Updated: 11/16/23 1514

## 2023-11-17 NOTE — PLAN OF CARE
Problem: Adult Inpatient Plan of Care  Goal: Plan of Care Review  Outcome: Ongoing, Progressing  Goal: Patient-Specific Goal (Individualized)  Outcome: Ongoing, Progressing  Goal: Absence of Hospital-Acquired Illness or Injury  Outcome: Ongoing, Progressing  Goal: Optimal Comfort and Wellbeing  Outcome: Ongoing, Progressing  Goal: Readiness for Transition of Care  Outcome: Ongoing, Progressing     Problem: Diabetes Comorbidity  Goal: Blood Glucose Level Within Targeted Range  Outcome: Ongoing, Progressing     Problem: Adjustment to Illness (Sepsis/Septic Shock)  Goal: Optimal Coping  Outcome: Ongoing, Progressing     Problem: Infection Progression (Sepsis/Septic Shock)  Goal: Absence of Infection Signs and Symptoms  Outcome: Ongoing, Progressing     Problem: Fluid and Electrolyte Imbalance (Acute Kidney Injury/Impairment)  Goal: Fluid and Electrolyte Balance  Outcome: Ongoing, Progressing

## 2023-11-17 NOTE — ASSESSMENT & PLAN NOTE
-ROXANNE in setting of infection and decreased PO intake  -admission SCr 1.5  -baseline 1-1.2  -IVF hydration as noted above  -trend labs, address/replete electrolytes as indicated  -avoid nephrotoxins and hypotension as able, renally dose medications  -monitor

## 2023-11-17 NOTE — ASSESSMENT & PLAN NOTE
Chi Mckeon is a 58 y.o.M admitted for sepsis with emphysematous cystitis.    - Recommend bronson placement  - Continue broad spectrum IV abx  - Follow up urine culture, tailor to cultures  - Patient previously on Flomax. Recommend restarting and resuming upon discharge  - Maintain bronson upon discharge. Will arrange outpatient urology follow up for removal.   - Rest of care per primary

## 2023-11-17 NOTE — HPI
"Per Bouchra Varma NP:    "Mr. Mckeon is a 58 YOM with PMHx of combined systolic and diastolic CHF, CAD (RCA ), HTN, HLD, DM type II, PVD, s/p R BKA with chronic stump pain, GOMEZ, former smoker, and MDD/anxiety.     He presents to ED due to complaints of constipation, urinary frequency, decreased UOP, diffuse abdominal pain, chills/rigors, decreased appetite, decreased PO intake, N/V, HA, light headiness, dizziness, malaise, and generally feeling unwell. He notes constipation since 11/6 and other complaints have been present for a few days now. He reports malaise has been so significant he has been mostly bed bound this week. He denies SOB, SINHA, CP, diarrhea, constipation, seizures, or syncope. He notes compliance with home medications. He lives with spouse, is independent in ADLs, and uses prosthesis and can for ambulation. Of note he was recently admitted 11/6-8 due to hyperglycemia and ROXANNE.     In the ED initially he was slightly hypotensive, -120, and afebrile and received brown bomb enema with excellent result. He was given rocephin for concern of developing UTI based on UA at which time he became febrile with TMAX 100.7, tachycardic with -150's, and worsening hypotension. He was initiated on sepsis protocol with IVF hydration and improvement in HR and BP.     Initial workup up noted CBC with WBC 7, stable H/H. Chemistry with BUN 34, SCr 1.5 (baseline 1-1.2), glucose 265, anion gap 8. LFTs WNL. Lactic acid 1.46. CPK 43. Procalcitonin 2.12. UA concerning for infection. Hep C and HIV non reactive. Flu and Covid negative. Urine toxicology screen + for marijuana. EKG with [facility] without acute finding. CXR without acute cardiopulmonary process. CT A/P noted emphysematous cystitis and scattered liquid stool seen throughout the colon. Blood cultures and urine culture in process.     The patient was admitted to the Hospital Medicine Service for further evaluation and management."  "

## 2023-11-17 NOTE — SUBJECTIVE & OBJECTIVE
Past Medical History:   Diagnosis Date    CHF (congestive heart failure)     Diabetes mellitus     type 2    Heart failure, unspecified     Hypertension     Unspecified glaucoma        Past Surgical History:   Procedure Laterality Date    AMPUTATION Right     below the knee of r side.  also amputation of L toe       Review of patient's allergies indicates:  No Known Allergies    No current facility-administered medications on file prior to encounter.     Current Outpatient Medications on File Prior to Encounter   Medication Sig    aspirin (ECOTRIN) 81 MG EC tablet Take 1 tablet by mouth once daily.    blood sugar diagnostic Strp 10 strips by Other route 3 (three) times daily as needed.    blood sugar diagnostic Strp 1 strip. Test blood sugar three times daily    blood-glucose meter kit Use as instructed    carvediloL (COREG) 6.25 MG tablet Take 6.25 mg by mouth 2 (two) times daily with meals.    COMBIGAN 0.2-0.5 % Drop INSTILL 1 DROP INTO EACH EYE EVERY 12 HOURS    dorzolamide (TRUSOPT) 2 % ophthalmic solution INSTILL 1 DROPS INTO EACH EYE THREE TIMES DAILY    FEROSUL 325 mg (65 mg iron) Tab tablet Take by mouth every other day.    FLUoxetine 20 MG capsule TAKE 1 CAPSULE BY MOUTH ONCE DAILY FOR 21 DAYS    furosemide (LASIX) 40 MG tablet Take 40 mg by mouth once daily.    glycopyrrolate (ROBINUL) 1 mg Tab Take 1 mg by mouth 2 (two) times daily.    JARDIANCE 10 mg tablet Take 10 mg by mouth once daily.    lancets 30 gauge Misc 1 each by Other route 3 (three) times daily as needed.    LANTUS SOLOSTAR U-100 INSULIN glargine 100 units/mL SubQ pen Inject 20 Units into the skin 2 (two) times a day.    latanoprost 0.005 % ophthalmic solution INSTILL 1 DROP INTO EACH EYE ONCE DAILY AT NIGHT    lisinopriL (PRINIVIL,ZESTRIL) 40 MG tablet Take 40 mg by mouth once daily.    metFORMIN (GLUCOPHAGE) 1000 MG tablet Take 1,000 mg by mouth 2 (two) times daily with meals.    mirtazapine (REMERON) 15 MG tablet Take 15 mg by mouth every  "evening.    NOVOLOG FLEXPEN U-100 INSULIN 100 unit/mL (3 mL) InPn pen INJECT 10 UNITS SUBCUTANEOUSLY THREE TIMES DAILY BEFORE MEAL(S)    OZEMPIC 0.25 mg or 0.5 mg(2 mg/1.5 mL) pen injector INJECT 0.25MG SUBCUTANEOUSLY ONCE A WEEK    pantoprazole (PROTONIX) 40 MG tablet Take 40 mg by mouth every morning.    pen needle, diabetic 32 gauge x 5/32" Ndle 1 each by Misc.(Non-Drug; Combo Route) route 4 (four) times daily before meals and nightly.    rosuvastatin (CRESTOR) 40 MG Tab Take 40 mg by mouth once daily.    senna (SENOKOT) 8.6 mg tablet Take 1 tablet by mouth once daily.    spironolactone (ALDACTONE) 25 MG tablet Take 25 mg by mouth once daily.    traZODone (DESYREL) 50 MG tablet Take 100 mg by mouth nightly.    venlafaxine (EFFEXOR-XR) 150 MG Cp24 Take 150 mg by mouth once daily.     Family History    None       Tobacco Use    Smoking status: Not on file    Smokeless tobacco: Not on file   Substance and Sexual Activity    Alcohol use: Not on file    Drug use: Not on file    Sexual activity: Not on file     Review of Systems   Constitutional:  Positive for activity change, appetite change, chills, fatigue and fever. Negative for diaphoresis and unexpected weight change.   HENT:  Negative for congestion, sore throat and trouble swallowing.    Respiratory:  Negative for cough, chest tightness, shortness of breath and wheezing.    Cardiovascular:  Negative for chest pain, palpitations and leg swelling.   Gastrointestinal:  Positive for abdominal pain, constipation, nausea and vomiting. Negative for abdominal distention and diarrhea.   Genitourinary:  Positive for decreased urine volume, dysuria, frequency and urgency. Negative for difficulty urinating.   Musculoskeletal:  Positive for gait problem (chronic).   Neurological:  Positive for dizziness, weakness, light-headedness and headaches. Negative for syncope.     Objective:     Vital Signs (Most Recent):  Temp: 99 °F (37.2 °C) (11/16/23 2122)  Pulse: (!) 122 " (11/16/23 2122)  Resp: 18 (11/16/23 2122)  BP: (!) 108/55 (11/16/23 2122)  SpO2: 97 % (11/16/23 2122) Vital Signs (24h Range):  Temp:  [98.6 °F (37 °C)-100.7 °F (38.2 °C)] 99 °F (37.2 °C)  Pulse:  [109-148] 122  Resp:  [16-20] 18  SpO2:  [96 %-100 %] 97 %  BP: ()/(49-85) 108/55     Weight: 89.8 kg (198 lb)  Body mass index is 24.75 kg/m².     Physical Exam  Vitals and nursing note reviewed.   Constitutional:       General: He is not in acute distress.     Appearance: He is well-developed. He is ill-appearing. He is not toxic-appearing.   HENT:      Head: Normocephalic and atraumatic.      Mouth/Throat:      Dentition: Normal dentition.   Eyes:      General: Lids are normal.      Extraocular Movements: Extraocular movements intact.      Conjunctiva/sclera: Conjunctivae normal.   Cardiovascular:      Rate and Rhythm: Regular rhythm. Tachycardia present.      Heart sounds: Normal heart sounds. No murmur heard.  Pulmonary:      Effort: Pulmonary effort is normal.      Breath sounds: Normal breath sounds.   Chest:      Chest wall: No tenderness.   Abdominal:      General: Bowel sounds are normal. There is no distension.      Palpations: Abdomen is soft.      Tenderness: There is abdominal tenderness (lower abdominal pain/tenderness).   Musculoskeletal:         General: Deformity (R BKA) present.      Cervical back: Neck supple.      Right lower leg: No edema.      Left lower leg: No edema.   Skin:     General: Skin is warm and dry.      Findings: No erythema or rash.   Neurological:      Mental Status: He is alert and oriented to person, place, and time.             Significant Labs: All pertinent labs within the past 24 hours have been reviewed.  CBC:   Recent Labs   Lab 11/16/23  1243   WBC 7.25   HGB 11.0*   HCT 34.5*        CMP:   Recent Labs   Lab 11/16/23  1243      K 5.8*   *   CO2 21*   *   BUN 34*   CREATININE 1.5*   CALCIUM 10.8*   PROT 7.9   ALBUMIN 3.2*   BILITOT 0.4   ALKPHOS  89   AST 22   ALT 25   ANIONGAP 8     Urine Studies:   Recent Labs   Lab 11/16/23  1438   COLORU Yellow   APPEARANCEUA Hazy*   PHUR 6.0   SPECGRAV 1.020   PROTEINUA 1+*   GLUCUA 4+*   KETONESU Trace*   BILIRUBINUA Negative   OCCULTUA Negative   NITRITE Negative   UROBILINOGEN Negative   LEUKOCYTESUR 1+*   RBCUA 0   WBCUA 32*   BACTERIA Many*   SQUAMEPITHEL 2   HYALINECASTS 0     Microbiology Results (last 7 days)       Procedure Component Value Units Date/Time    Blood culture x two cultures. Draw prior to antibiotics. [[id number]] Collected: 11/16/23 2000    Order Status: Sent Specimen: Blood from Peripheral, Antecubital, Right Updated: 11/16/23 2001    Blood culture x two cultures. Draw prior to antibiotics. [[id number]] Collected: 11/16/23 2000    Order Status: Sent Specimen: Blood from Peripheral, Antecubital, Left Updated: 11/16/23 2000    Urine culture [[id number]] Collected: 11/16/23 1438    Order Status: No result Specimen: Urine Updated: 11/16/23 1514          Significant Imaging: I have reviewed all pertinent imaging results/findings within the past 24 hours.

## 2023-11-17 NOTE — HOSPITAL COURSE
Patient is a 58-year-old man with poorly controlled diabetes, chronic diastolic and systolic heart failure, peripheral vascular disease status post prior right below-the-knee amputation, prior tobacco smoker admitted the hospital with evidence of sepsis secondary urinary tract infection.    Patient volume resuscitated and started on broad-spectrum antibiotic therapy.  Patient's hold blood pressure medications held due t hypotension. Patient also wound proximal to his right stump which has been present for over a month following prior fall for which he is receiving wound care.  Wound foul-smelling.  Surgery consult recommended further wound care and chemical debridement but no surgical debridement at this time.    Blood pressure recovered and he was started on lower dose of blood pressure medication and also avoiding medication that can cause hyperkalemia due to mild hyperkalemia noted during this hospital stay.  Repeat serum potassium within normal range.    Urology recommended that patient maintain Magana catheter pending outpatient clinic follow-up for voiding trial.  Patient prescribed an additional 10 days (14 days of treatment total) of antibiotic therapy.  Risks of antibiotic therapy discussed.    Empagliflozin (Jardiance) during hospital stay and on discharge due to urinary tract infection.  Reasonable to restart after infection resolves.  I also holding metformin due to acute kidney injury on presentation likely due to infection/hypotension.  Might be reasonable to restart metformin as pending repeat outpatient chemistry.     I also recommend patient have home health due to history of medication mismanagement.  Patient instructed to only take medication as currently prescribed for now and on the importance of close clinic follow-up for ongoing management of his co-morbidities and to help with wound care.

## 2023-11-17 NOTE — ASSESSMENT & PLAN NOTE
-chronic, not well controlled   -recent HgA1C 14.9 in September 2023  -hold home lantus, aspart, jardiance, and metformin   -begin dose reduced detemir and LD SSI  -dose/medication adjustment as appropriate   -monitor accuchecks AC/HS and PRN hypoglycemic protocol   -monitor

## 2023-11-17 NOTE — ASSESSMENT & PLAN NOTE
Emphysematous cystitis  Abdominal pain  Constipation  -sepsis in setting of emphysematous cystitis  -at admission tachycardia and hypotension noted with improvement after IVF resuscitation   -admission labs:  -CBC with WBC 7, stable H/H. Chemistry with BUN 34, SCr 1.5 (baseline 1-1.2), glucose 265, anion gap 8. LFTs WNL. Lactic acid 1.46. CPK 43. Procalcitonin 2.12. UA concerning for infection. Hep C and HIV non reactive. Flu and Covid negative. Urine toxicology screen + for marijuana.  -admission diagnostics:  -EKG with [facility] without acute finding. CXR without acute cardiopulmonary process. CT A/P noted emphysematous cystitis and scattered liquid stool seen throughout the colon.  -admission micro:  -Blood cultures and urine culture in process.   -continue gentle IVF resuscitation with known CHF and normal lactic >>> titrate as indicated  -begin meropenem and vancomycin  -tailor/de-escalate as appropriate   -begin vasopressors if clinically indicated  -continue bowel regimen   -PRN supportive care as indicated  -strict I&Os and daily weights  -trend labs, address/replete electrolytes as indicated  -monitor

## 2023-11-17 NOTE — CONSULTS
"Surgery Consult Note      SUBJECTIVE:     Chief Complaint: wound     History of Present Illness:  Chi Mckeon is a 58 y.o. male with history of CHF, CAD, HTN, DM, PVD s/p R BKA who presents with constipation after recent discharge on 11/8.  He was noted to be hypotensive and tachycardic in the ED.  Was treated for UTI based on UA.  CT consistent with emphysematous cystitis.  Sepsis protocol initiated and patient admitted to ICU for treatment.  General surgery consulted for wound of right stump BKA.  Patient reports that wound has been present for a few weeks.       Review of patient's allergies indicates:  No Known Allergies    Past Medical History:   Diagnosis Date    CHF (congestive heart failure)     Diabetes mellitus     type 2    Heart failure, unspecified     Hypertension     Unspecified glaucoma      Past Surgical History:   Procedure Laterality Date    AMPUTATION Right     below the knee of r side.  also amputation of L toe     History reviewed. No pertinent family history.        Review of Systems:  Review of Systems   All other systems reviewed and are negative.      OBJECTIVE:     Vital Signs (Most Recent)  BP (!) 126/59 (BP Location: Left arm, Patient Position: Lying)   Pulse 100   Temp 98.9 °F (37.2 °C) (Oral)   Resp (!) 23   Ht 6' 3" (1.905 m)   Wt 88.5 kg (195 lb 1.7 oz)   SpO2 98%   BMI 24.39 kg/m²     Physical Exam:  General: 58 y.o. male in no distress   Neuro: alert and oriented x 4.  Moves all extremities.     HEENT: normocephalic, atraumatic, PERRL, EOMI   Respiratory: respirations are even and unlabored  Cardiac: regular rate and rhythm  Abdomen:   Extremities: R BKA with two shallow ulcers on the anterior aspect of the knee, no gross fibrinous exudate, fluctuance or evidence of infection   Skin: no rashes    Labs:   Recent Results (from the past 336 hour(s))   CBC auto differential    Collection Time: 11/17/23  3:55 AM   Result Value Ref Range    WBC 13.63 (H) 3.90 - 12.70 K/uL    " Hemoglobin 9.4 (L) 14.0 - 18.0 g/dL    Hematocrit 29.5 (L) 40.0 - 54.0 %    Platelets 268 150 - 450 K/uL   CBC auto differential    Collection Time: 11/16/23 12:43 PM   Result Value Ref Range    WBC 7.25 3.90 - 12.70 K/uL    Hemoglobin 11.0 (L) 14.0 - 18.0 g/dL    Hematocrit 34.5 (L) 40.0 - 54.0 %    Platelets 346 150 - 450 K/uL   CBC with Automated Differential    Collection Time: 11/08/23  4:20 AM   Result Value Ref Range    WBC 5.73 3.90 - 12.70 K/uL    Hemoglobin 9.5 (L) 14.0 - 18.0 g/dL    Hematocrit 30.7 (L) 40.0 - 54.0 %    Platelets 255 150 - 450 K/uL                 Component Ref Range & Units 03:55 1 d ago 9 d ago 10 d ago 11 d ago 6 mo ago 7 mo ago   Sodium 136 - 145 mmol/L 138 142 137 136 130 Low  142 R 140 R   Potassium 3.5 - 5.1 mmol/L 4.9 5.8 High  4.8 4.7 5.0 CM     Chloride 95 - 110 mmol/L 113 High  113 High  106 105 98 103 R 104 R   CO2 23 - 29 mmol/L 17 Low  21 Low  22 Low  22 Low  19 Low  29 R 29 R   Glucose 70 - 110 mg/dL 255 High  265 High  162 High  397 High  642 High Panic  CM 79 R 112 High  R   BUN 6 - 20 mg/dL 29 High  34 High  30 High  35 High  39 High      Creatinine 0.5 - 1.4 mg/dL 1.3 1.5 High  1.4 1.6 High  2.2 High  1.07 R 1.16 R   Calcium 8.7 - 10.5 mg/dL 8.6 Low  10.8 High  9.6 9.7 9.7 9.3 R 9.2 R   Total Protein 6.0 - 8.4 g/dL 6.0 7.9 6.6 6.7 7.3 6.5 R    Albumin 3.5 - 5.2 g/dL 2.2 Low  3.2 Low  2.6 Low  2.7 Low  3.0 Low  3.8 R    Total Bilirubin 0.1 - 1.0 mg/dL 0.3 0.4 CM 0.2 CM 0.3 CM 0.4 CM 0.4 R       Alkaline Phosphatase 55 - 135 U/L 85 89 87 93 97     AST 10 - 40 U/L 26 22 23 14 12 17 R    ALT 10 - 44 U/L 26 25 26 25 27 40 R    eGFR >60 mL/min/1.73 m^2 >60 53 Abnormal  58 Abnormal  49 Abnormal  34 Abnormal      Anion Gap 8 - 16 mmol/L 8 8 9 9 13     [facility]  BALB BALB BALB BALB [facility]                     Imaging:   FINDINGS:  The visualized portion of the heart is unremarkable.  Mild atelectasis or scarring is seen at the right lung base.  No  evidence of focal consolidation or pleural effusion.  Small amount of fluid is seen in the distal visualized esophagus which may be seen with gastroesophageal reflux.     No significant hepatic abnormality seen on this noncontrast exam.  There is no intra-or extrahepatic biliary ductal dilatation.  The gallbladder is unremarkable.  The stomach, pancreas, spleen, and adrenal glands are unremarkable.     Kidneys show no evidence of stones or hydronephrosis.  Small bilateral renal cysts are seen.  Ureters are unremarkable along their courses.  There is dependent and non dependent intramural and/or intraluminal air seen involving the urinary bladder consistent with emphysematous cystitis.  Prostate is unremarkable.     Appendix is visualized and is unremarkable.  The visualized loops of small and large bowel show no evidence of obstruction or inflammation.  Scattered liquid stool is seen in the colon.  No free air or free fluid.     Aorta tapers normally with mild to moderate atherosclerosis.     No acute osseous abnormality identified. Subcutaneous soft tissues show no significant abnormalities.     Impression:     1. Emphysematous cystitis.  Correlate with clinical symptoms and urinalysis.  2. Scattered liquid stool seen throughout the colon which may be seen with diarrheal illness.  3. Additional findings as detailed above.  This report was flagged in Epic as abnormal.        Electronically signed by: Gayatri Ramirez MD  Date:                                            11/16/2023  Time:                                           20:21      ASSESSMENT/PLAN:       58 y.o. male with ulcer of R BKA stump     - labs and imaging reviewed, sepsis secondary to emphysematous cystitis  - nonhealing ulcers of RLE, no evidence of acute infection or need for urgent surgical intervention  - recommend local wound care  - follow up with vascular surgery on outpatient basis  - will sign off, call with questions or concerns     Renetta  MD Ninoska  Staff Surgeon  Colon & Rectal Surgery

## 2023-11-17 NOTE — DISCHARGE INSTRUCTIONS
Mr. Mckeon,    Thank you for letting me care for you today! It was nice meeting you, and I hope you feel better soon.   If you would like access to your chart and what was done today please utilize the [facility].   Please come back to [facility] for all of your future medical needs.    Our goal in the emergency department is to always give you outstanding care and exceptional service. You may receive a survey by mail or e-mail in the next week regarding your experience in our ED. We would greatly appreciate you completing and returning the survey. Your feedback provides us with a way to recognize our staff who give very good care and it helps us learn how to improve when your experience was below our aspiration of excellence.     Sincerely,    Nitish Chawla MD  Board Certified Emergency Physician

## 2023-11-17 NOTE — HPI
Chi Mckeon is a 58 y.o.M recently admitted for hyperglycemia and ROXANNE and discharged on 11/08. He presented to the ED complaining of constipation since discharge and decreased urine output. He was found to be febrile and tachycardic and is now admitted for sepsis. Urology consulted for emphysematous cystitis.     On assessment, AFVSS. WBC 13.63, Hgb 9.4, Cr 1.3. UA with many bacteria, concerning for infection. Culture pending. CT revealed intramural and intraluminal air in the bladder, concerning for emphysematous cystitis. Patient without Magana catheter at time of assessment. He denies prior issues voiding. He was on Flomax for nocturia in the past but discontinued it citing urinary frequency. He denies dysuria, frequency, urgency, or hematuria.

## 2023-11-17 NOTE — CARE UPDATE
11/17/23 0808   Patient Assessment/Suction   Level of Consciousness (AVPU) alert   PRE-TX-O2   Device (Oxygen Therapy) room air   SpO2 99 %   Pulse Oximetry Type Continuous   $ Pulse Oximetry - Multiple Charge Pulse Oximetry - Multiple   Pulse 105   Resp (!) 31

## 2023-11-17 NOTE — PLAN OF CARE
11/17/23 0857   Medicare Message   Important Message from Medicare regarding Discharge Appeal Rights Given to patient/caregiver;Explained to patient/caregiver;Signed/date by patient/caregiver   Date IMM was signed 11/17/23   Time IMM was signed 0808

## 2023-11-17 NOTE — ASSESSMENT & PLAN NOTE
-chronic, H/H stable  -no s/s of bleeding at current  -continue home iron supplement  -trend labs, transfuse as indicated   -monitor

## 2023-11-17 NOTE — EICU
EICU BRIEF INITIAL EVALUATION:       HISTORY:      58-year-old gentleman admitted to ICU for severe sepsis due to emphysematous cystitis.  Hypotension responded to volume.    History of combined CHF, coronary artery disease, hypertension, hyperlipidemia, type 2 diabetes, peripheral vascular disease status post right BKA amputation, iron-deficiency anemia, former smoker    DVT prophylaxis Lovenox, SCDs  GI prophylaxis PPI    /58 (77), P 115, R 28, O2 sat 96   Resting comfortably on room air      CAMERA ASSESSMENT: Yes      TELEMETRY: Reviewed      NOTES: Reviewed     LABS: Reviewed      IMAGING: Personally reviewed.      DISCUSSED with bedside nurse        ASSESSMENT AND PLAN:       Severe sepsis-continue hydration, IV antibiotics, ICU monitoring.  Follow cultures      I have reviewed the plan of care for the patient and agree with the plan of care unless noted otherwise above.      DELIA Neely MD  eICU Attending  [phone]    This report has been created through the use of M-Modal dictation software. Typographical and content errors may occur with this process. While efforts are made to detect and correct such errors, in some cases errors will persist. For this reason, wording in this document should be considered in the proper context and not strictly verbatim

## 2023-11-17 NOTE — SUBJECTIVE & OBJECTIVE
Interval History: Patient reports some dysuria but improving.  Off vasopressors this morning.    Review of Systems   Constitutional:  Negative for chills and fever.   Respiratory:  Negative for shortness of breath.    Cardiovascular:  Negative for chest pain.   Gastrointestinal:  Negative for abdominal pain, constipation, diarrhea, nausea and vomiting.   Genitourinary:  Positive for dysuria.     Objective:     Vital Signs (Most Recent):  Temp: 98.7 °F (37.1 °C) (11/17/23 1100)  Pulse: 93 (11/17/23 1400)  Resp: (!) 22 (11/17/23 1400)  BP: (!) 114/57 (11/17/23 1400)  SpO2: 96 % (11/17/23 1400) Vital Signs (24h Range):  Temp:  [97.9 °F (36.6 °C)-100.8 °F (38.2 °C)] 98.7 °F (37.1 °C)  Pulse:  [] 93  Resp:  [10-44] 22  SpO2:  [87 %-100 %] 96 %  BP: ()/(43-85) 114/57     Weight: 88.5 kg (195 lb 1.7 oz)  Body mass index is 24.39 kg/m².    Intake/Output Summary (Last 24 hours) at 11/17/2023 1450  Last data filed at 11/17/2023 1416  Gross per 24 hour   Intake 2599 ml   Output 2305 ml   Net 294 ml         Physical Exam  Cardiovascular:      Rate and Rhythm: Normal rate and regular rhythm.      Heart sounds: Normal heart sounds. No murmur heard.     No friction rub. No gallop.   Pulmonary:      Effort: Pulmonary effort is normal. No respiratory distress.      Breath sounds: Normal breath sounds. No wheezing or rales.   Abdominal:      General: Bowel sounds are normal. There is no distension.      Palpations: Abdomen is soft.      Tenderness: There is no abdominal tenderness.   Musculoskeletal:         General: No tenderness. Normal range of motion.   Skin:     General: Skin is warm and dry.      Coloration: Skin is not pale.      Findings: No erythema or rash.      Comments: Wounds over his right below-the-knee amputation stump, foul-smelling but without induration   Neurological:      Mental Status: He is alert and oriented to person, place, and time.             Significant Labs: All pertinent labs within the  past 24 hours have been reviewed.    Significant Imaging: I have reviewed all pertinent imaging results/findings within the past 24 hours.

## 2023-11-17 NOTE — H&P
[facility] Medicine  History & Physical    Patient Name: Chi Mckeon  MRN: [id number]  Patient Class: IP- Inpatient  Admission Date: 11/16/2023  Attending Physician: Jony Villarreal MD   Primary Care Provider: Kip Devine MD    Patient information was obtained from patient, past medical records, and ER records.     Subjective:     Principal Problem:Severe sepsis    Chief Complaint:   Chief Complaint   Patient presents with    Constipation     Pt reports being discharged from hospital on 11/8 and has not had a BM since. Pt reporting lower abdominal pain and lower back pain. Denies N/V. LBM 11/5.        HPI: Mr. Mckeon is a 58 YOM with PMHx of combined systolic and diastolic CHF, CAD (RCA ), HTN, HLD, DM type II, PVD, s/p R BKA with chronic stump pain, GOMEZ, former smoker, and MDD/anxiety.     He presents to ED due to complaints of constipation, urinary frequency, decreased UOP, diffuse abdominal pain, chills/rigors, decreased appetite, decreased PO intake, N/V, HA, light headiness, dizziness, malaise, and generally feeling unwell. He notes constipation since 11/6 and other complaints have been present for a few days now. He reports malaise has been so significant he has been mostly bed bound this week. He denies SOB, SINHA, CP, diarrhea, constipation, seizures, or syncope. He notes compliance with home medications. He lives with spouse, is independent in ADLs, and uses prosthesis and can for ambulation. Of note he was recently admitted 11/6-8 due to hyperglycemia and ROXANNE.     In the ED initially he was slightly hypotensive, -120, and afebrile and received brown bomb enema with excellent result. He was given rocephin for concern of developing UTI based on UA at which time he became febrile with TMAX 100.7, tachycardic with -150's, and worsening hypotension. He was initiated on sepsis protocol with IVF hydration and improvement in HR and BP.     Initial workup up noted CBC  with WBC 7, stable H/H. Chemistry with BUN 34, SCr 1.5 (baseline 1-1.2), glucose 265, anion gap 8. LFTs WNL. Lactic acid 1.46. CPK 43. Procalcitonin 2.12. UA concerning for infection. Hep C and HIV non reactive. Flu and Covid negative. Urine toxicology screen + for marijuana. EKG with ST[facility] without acute finding. CXR without acute cardiopulmonary process. CT A/P noted emphysematous cystitis and scattered liquid stool seen throughout the colon. Blood cultures and urine culture in process.     The patient was admitted to the Hospital Medicine Service for further evaluation and management.     Past Medical History:   Diagnosis Date    CHF (congestive heart failure)     Diabetes mellitus     type 2    Heart failure, unspecified     Hypertension     Unspecified glaucoma        Past Surgical History:   Procedure Laterality Date    AMPUTATION Right     below the knee of r side.  also amputation of L toe       Review of patient's allergies indicates:  No Known Allergies    No current facility-administered medications on file prior to encounter.     Current Outpatient Medications on File Prior to Encounter   Medication Sig    aspirin (ECOTRIN) 81 MG EC tablet Take 1 tablet by mouth once daily.    blood sugar diagnostic Strp 10 strips by Other route 3 (three) times daily as needed.    blood sugar diagnostic Strp 1 strip. Test blood sugar three times daily    blood-glucose meter kit Use as instructed    carvediloL (COREG) 6.25 MG tablet Take 6.25 mg by mouth 2 (two) times daily with meals.    COMBIGAN 0.2-0.5 % Drop INSTILL 1 DROP INTO EACH EYE EVERY 12 HOURS    dorzolamide (TRUSOPT) 2 % ophthalmic solution INSTILL 1 DROPS INTO EACH EYE THREE TIMES DAILY    FEROSUL 325 mg (65 mg iron) Tab tablet Take by mouth every other day.    FLUoxetine 20 MG capsule TAKE 1 CAPSULE BY MOUTH ONCE DAILY FOR 21 DAYS    furosemide (LASIX) 40 MG tablet Take 40 mg by mouth once daily.    glycopyrrolate (ROBINUL) 1 mg Tab Take 1 mg by mouth 2  "(two) times daily.    JARDIANCE 10 mg tablet Take 10 mg by mouth once daily.    lancets 30 gauge Misc 1 each by Other route 3 (three) times daily as needed.    LANTUS SOLOSTAR U-100 INSULIN glargine 100 units/mL SubQ pen Inject 20 Units into the skin 2 (two) times a day.    latanoprost 0.005 % ophthalmic solution INSTILL 1 DROP INTO EACH EYE ONCE DAILY AT NIGHT    lisinopriL (PRINIVIL,ZESTRIL) 40 MG tablet Take 40 mg by mouth once daily.    metFORMIN (GLUCOPHAGE) 1000 MG tablet Take 1,000 mg by mouth 2 (two) times daily with meals.    mirtazapine (REMERON) 15 MG tablet Take 15 mg by mouth every evening.    NOVOLOG FLEXPEN U-100 INSULIN 100 unit/mL (3 mL) InPn pen INJECT 10 UNITS SUBCUTANEOUSLY THREE TIMES DAILY BEFORE MEAL(S)    OZEMPIC 0.25 mg or 0.5 mg(2 mg/1.5 mL) pen injector INJECT 0.25MG SUBCUTANEOUSLY ONCE A WEEK    pantoprazole (PROTONIX) 40 MG tablet Take 40 mg by mouth every morning.    pen needle, diabetic 32 gauge x 5/32" Ndle 1 each by Misc.(Non-Drug; Combo Route) route 4 (four) times daily before meals and nightly.    rosuvastatin (CRESTOR) 40 MG Tab Take 40 mg by mouth once daily.    senna (SENOKOT) 8.6 mg tablet Take 1 tablet by mouth once daily.    spironolactone (ALDACTONE) 25 MG tablet Take 25 mg by mouth once daily.    traZODone (DESYREL) 50 MG tablet Take 100 mg by mouth nightly.    venlafaxine (EFFEXOR-XR) 150 MG Cp24 Take 150 mg by mouth once daily.     Family History    None       Tobacco Use    Smoking status: Not on file    Smokeless tobacco: Not on file   Substance and Sexual Activity    Alcohol use: Not on file    Drug use: Not on file    Sexual activity: Not on file     Review of Systems   Constitutional:  Positive for activity change, appetite change, chills, fatigue and fever. Negative for diaphoresis and unexpected weight change.   HENT:  Negative for congestion, sore throat and trouble swallowing.    Respiratory:  Negative for cough, chest tightness, shortness of breath and wheezing. "    Cardiovascular:  Negative for chest pain, palpitations and leg swelling.   Gastrointestinal:  Positive for abdominal pain, constipation, nausea and vomiting. Negative for abdominal distention and diarrhea.   Genitourinary:  Positive for decreased urine volume, dysuria, frequency and urgency. Negative for difficulty urinating.   Musculoskeletal:  Positive for gait problem (chronic).   Neurological:  Positive for dizziness, weakness, light-headedness and headaches. Negative for syncope.     Objective:     Vital Signs (Most Recent):  Temp: 99 °F (37.2 °C) (11/16/23 2122)  Pulse: (!) 122 (11/16/23 2122)  Resp: 18 (11/16/23 2122)  BP: (!) 108/55 (11/16/23 2122)  SpO2: 97 % (11/16/23 2122) Vital Signs (24h Range):  Temp:  [98.6 °F (37 °C)-100.7 °F (38.2 °C)] 99 °F (37.2 °C)  Pulse:  [109-148] 122  Resp:  [16-20] 18  SpO2:  [96 %-100 %] 97 %  BP: ()/(49-85) 108/55     Weight: 89.8 kg (198 lb)  Body mass index is 24.75 kg/m².     Physical Exam  Vitals and nursing note reviewed.   Constitutional:       General: He is not in acute distress.     Appearance: He is well-developed. He is ill-appearing. He is not toxic-appearing.   HENT:      Head: Normocephalic and atraumatic.      Mouth/Throat:      Dentition: Normal dentition.   Eyes:      General: Lids are normal.      Extraocular Movements: Extraocular movements intact.      Conjunctiva/sclera: Conjunctivae normal.   Cardiovascular:      Rate and Rhythm: Regular rhythm. Tachycardia present.      Heart sounds: Normal heart sounds. No murmur heard.  Pulmonary:      Effort: Pulmonary effort is normal.      Breath sounds: Normal breath sounds.   Chest:      Chest wall: No tenderness.   Abdominal:      General: Bowel sounds are normal. There is no distension.      Palpations: Abdomen is soft.      Tenderness: There is abdominal tenderness (lower abdominal pain/tenderness).   Musculoskeletal:         General: Deformity (R BKA) present.      Cervical back: Neck supple.       Right lower leg: No edema.      Left lower leg: No edema.   Skin:     General: Skin is warm and dry.      Findings: No erythema or rash.   Neurological:      Mental Status: He is alert and oriented to person, place, and time.             Significant Labs: All pertinent labs within the past 24 hours have been reviewed.  CBC:   Recent Labs   Lab 11/16/23  1243   WBC 7.25   HGB 11.0*   HCT 34.5*        CMP:   Recent Labs   Lab 11/16/23  1243      K 5.8*   *   CO2 21*   *   BUN 34*   CREATININE 1.5*   CALCIUM 10.8*   PROT 7.9   ALBUMIN 3.2*   BILITOT 0.4   ALKPHOS 89   AST 22   ALT 25   ANIONGAP 8     Urine Studies:   Recent Labs   Lab 11/16/23  1438   COLORU Yellow   APPEARANCEUA Hazy*   PHUR 6.0   SPECGRAV 1.020   PROTEINUA 1+*   GLUCUA 4+*   KETONESU Trace*   BILIRUBINUA Negative   OCCULTUA Negative   NITRITE Negative   UROBILINOGEN Negative   LEUKOCYTESUR 1+*   RBCUA 0   WBCUA 32*   BACTERIA Many*   SQUAMEPITHEL 2   HYALINECASTS 0     Microbiology Results (last 7 days)       Procedure Component Value Units Date/Time    Blood culture x two cultures. Draw prior to antibiotics. [[id number]] Collected: 11/16/23 2000    Order Status: Sent Specimen: Blood from Peripheral, Antecubital, Right Updated: 11/16/23 2001    Blood culture x two cultures. Draw prior to antibiotics. [[id number]] Collected: 11/16/23 2000    Order Status: Sent Specimen: Blood from Peripheral, Antecubital, Left Updated: 11/16/23 2000    Urine culture [[id number]] Collected: 11/16/23 1438    Order Status: No result Specimen: Urine Updated: 11/16/23 1514          Significant Imaging: I have reviewed all pertinent imaging results/findings within the past 24 hours.  Assessment/Plan:     * Severe sepsis  Emphysematous cystitis  Abdominal pain  Constipation  -sepsis in setting of emphysematous cystitis  -at admission tachycardia and hypotension noted with improvement after IVF resuscitation   -admission labs:  -CBC with WBC 7,  stable H/H. Chemistry with BUN 34, SCr 1.5 (baseline 1-1.2), glucose 265, anion gap 8. LFTs WNL. Lactic acid 1.46. CPK 43. Procalcitonin 2.12. UA concerning for infection. Hep C and HIV non reactive. Flu and Covid negative. Urine toxicology screen + for marijuana.  -admission diagnostics:  -EKG with [facility] without acute finding. CXR without acute cardiopulmonary process. CT A/P noted emphysematous cystitis and scattered liquid stool seen throughout the colon.  -admission micro:  -Blood cultures and urine culture in process.   -continue gentle IVF resuscitation with known CHF and normal lactic >>> titrate as indicated  -begin meropenem and vancomycin  -tailor/de-escalate as appropriate   -begin vasopressors if clinically indicated  -continue bowel regimen   -PRN supportive care as indicated  -strict I&Os and daily weights  -trend labs, address/replete electrolytes as indicated  -monitor     Acute kidney injury  -ROXANNE in setting of infection and decreased PO intake  -admission SCr 1.5  -baseline 1-1.2  -IVF hydration as noted above  -trend labs, address/replete electrolytes as indicated  -avoid nephrotoxins and hypotension as able, renally dose medications  -monitor     Chronic combined systolic and diastolic heart failure  Primary hypertension  -chronic, compensated on physical exam and BP low/normotensive  -hold home carvedilol, lasix, lisinopril, and aldactone >>> resume when clinically appropriate  -monitor for toleration of fluid resuscitation  -diuresis as needed   -monitor     Type 2 diabetes mellitus with hyperglycemia, with long-term current use of insulin  -chronic, not well controlled   -recent HgA1C 14.9 in September 2023  -hold home lantus, aspart, jardiance, and metformin   -begin dose reduced detemir and LD SSI  -dose/medication adjustment as appropriate   -monitor accuchecks AC/HS and PRN hypoglycemic protocol   -monitor     Atherosclerosis of coronary artery   of RCA  HLD  -chronic, no s/s of ACS  at current  -continue home ASA and statin  -EKG PRN concerns  -monitor     Iron deficiency anemia due to chronic blood loss  -chronic, H/H stable  -no s/s of bleeding at current  -continue home iron supplement  -trend labs, transfuse as indicated   -monitor     Adjustment disorder with mixed anxiety and depressed mood  -chronic  -controlled  -continue home mirtazapine, trazodone, and effexor  -monitor     S/P unilateral BKA (below knee amputation)  -history of  -chronic pain in stump  -PRN supportive care as needed  -fall precautions      VTE Risk Mitigation (From admission, onward)           Ordered     enoxaparin injection 40 mg  Daily         11/16/23 2023     IP VTE HIGH RISK PATIENT  Once         11/16/23 2023     Place sequential compression device  Until discontinued         11/16/23 2023                       Bouchra Varma, DNP, AG-ACNP, BC  Department of Hospital Medicine  [facility]

## 2023-11-17 NOTE — PROGRESS NOTES
Patient reviewed, renal function stable, cultures reviewed, no new levels, continue current therapy; Next levels due: 11/18/2023 at 20:30    Please contact inpatient pharmacy at [phone] with any questions.     Thank you,  Wandy Mc  11/17/2023

## 2023-11-18 LAB
ANION GAP SERPL CALC-SCNC: 7 MMOL/L (ref 8–16)
BACTERIA UR CULT: ABNORMAL
BASOPHILS # BLD AUTO: 0.02 K/UL (ref 0–0.2)
BASOPHILS NFR BLD: 0.3 % (ref 0–1.9)
BUN SERPL-MCNC: 17 MG/DL (ref 6–20)
CALCIUM SERPL-MCNC: 8.8 MG/DL (ref 8.7–10.5)
CHLORIDE SERPL-SCNC: 112 MMOL/L (ref 95–110)
CO2 SERPL-SCNC: 18 MMOL/L (ref 23–29)
CREAT SERPL-MCNC: 1 MG/DL (ref 0.5–1.4)
DIFFERENTIAL METHOD: ABNORMAL
EOSINOPHIL # BLD AUTO: 0.1 K/UL (ref 0–0.5)
EOSINOPHIL NFR BLD: 1.5 % (ref 0–8)
ERYTHROCYTE [DISTWIDTH] IN BLOOD BY AUTOMATED COUNT: 20.5 % (ref 11.5–14.5)
EST. GFR  (NO RACE VARIABLE): >60 ML/MIN/1.73 M^2
GLUCOSE SERPL-MCNC: 247 MG/DL (ref 70–110)
HCT VFR BLD AUTO: 27.9 % (ref 40–54)
HGB BLD-MCNC: 8.8 G/DL (ref 14–18)
IMM GRANULOCYTES # BLD AUTO: 0.04 K/UL (ref 0–0.04)
IMM GRANULOCYTES NFR BLD AUTO: 0.5 % (ref 0–0.5)
LYMPHOCYTES # BLD AUTO: 1 K/UL (ref 1–4.8)
LYMPHOCYTES NFR BLD: 12.8 % (ref 18–48)
MAGNESIUM SERPL-MCNC: 2.3 MG/DL (ref 1.6–2.6)
MCH RBC QN AUTO: 25 PG (ref 27–31)
MCHC RBC AUTO-ENTMCNC: 31.5 G/DL (ref 32–36)
MCV RBC AUTO: 79 FL (ref 82–98)
MONOCYTES # BLD AUTO: 0.5 K/UL (ref 0.3–1)
MONOCYTES NFR BLD: 6.8 % (ref 4–15)
NEUTROPHILS # BLD AUTO: 5.8 K/UL (ref 1.8–7.7)
NEUTROPHILS NFR BLD: 78.1 % (ref 38–73)
NRBC BLD-RTO: 0 /100 WBC
PLATELET # BLD AUTO: 250 K/UL (ref 150–450)
PMV BLD AUTO: 8.6 FL (ref 9.2–12.9)
POCT GLUCOSE: 234 MG/DL (ref 70–110)
POCT GLUCOSE: 320 MG/DL (ref 70–110)
POCT GLUCOSE: 340 MG/DL (ref 70–110)
POCT GLUCOSE: 347 MG/DL (ref 70–110)
POTASSIUM SERPL-SCNC: 4.7 MMOL/L (ref 3.5–5.1)
RBC # BLD AUTO: 3.52 M/UL (ref 4.6–6.2)
SODIUM SERPL-SCNC: 137 MMOL/L (ref 136–145)
WBC # BLD AUTO: 7.45 K/UL (ref 3.9–12.7)

## 2023-11-18 PROCEDURE — 94761 N-INVAS EAR/PLS OXIMETRY MLT: CPT

## 2023-11-18 PROCEDURE — 97530 THERAPEUTIC ACTIVITIES: CPT

## 2023-11-18 PROCEDURE — 97161 PT EVAL LOW COMPLEX 20 MIN: CPT

## 2023-11-18 PROCEDURE — 63600175 PHARM REV CODE 636 W HCPCS: Performed by: NURSE PRACTITIONER

## 2023-11-18 PROCEDURE — 25000003 PHARM REV CODE 250: Performed by: HOSPITALIST

## 2023-11-18 PROCEDURE — 83735 ASSAY OF MAGNESIUM: CPT | Performed by: PHYSICIAN ASSISTANT

## 2023-11-18 PROCEDURE — 36415 COLL VENOUS BLD VENIPUNCTURE: CPT | Performed by: PHYSICIAN ASSISTANT

## 2023-11-18 PROCEDURE — 25000003 PHARM REV CODE 250: Performed by: NURSE PRACTITIONER

## 2023-11-18 PROCEDURE — 11000001 HC ACUTE MED/SURG PRIVATE ROOM

## 2023-11-18 PROCEDURE — 51702 INSERT TEMP BLADDER CATH: CPT

## 2023-11-18 PROCEDURE — 85025 COMPLETE CBC W/AUTO DIFF WBC: CPT | Performed by: HOSPITALIST

## 2023-11-18 PROCEDURE — 80048 BASIC METABOLIC PNL TOTAL CA: CPT | Performed by: PHYSICIAN ASSISTANT

## 2023-11-18 RX ORDER — INSULIN ASPART 100 [IU]/ML
8 INJECTION, SOLUTION INTRAVENOUS; SUBCUTANEOUS
Status: DISCONTINUED | OUTPATIENT
Start: 2023-11-18 | End: 2023-11-19 | Stop reason: HOSPADM

## 2023-11-18 RX ADMIN — MUPIROCIN: 20 OINTMENT TOPICAL at 08:11

## 2023-11-18 RX ADMIN — ENOXAPARIN SODIUM 40 MG: 40 INJECTION SUBCUTANEOUS at 04:11

## 2023-11-18 RX ADMIN — INSULIN ASPART 2 UNITS: 100 INJECTION, SOLUTION INTRAVENOUS; SUBCUTANEOUS at 09:11

## 2023-11-18 RX ADMIN — ASPIRIN 81 MG: 81 TABLET, COATED ORAL at 08:11

## 2023-11-18 RX ADMIN — INSULIN ASPART 4 UNITS: 100 INJECTION, SOLUTION INTRAVENOUS; SUBCUTANEOUS at 04:11

## 2023-11-18 RX ADMIN — INSULIN ASPART 4 UNITS: 100 INJECTION, SOLUTION INTRAVENOUS; SUBCUTANEOUS at 12:11

## 2023-11-18 RX ADMIN — MEROPENEM 1 G: 1 INJECTION, POWDER, FOR SOLUTION INTRAVENOUS at 01:11

## 2023-11-18 RX ADMIN — GLYCOPYRROLATE 1 MG: 1 TABLET ORAL at 08:11

## 2023-11-18 RX ADMIN — VENLAFAXINE HYDROCHLORIDE 150 MG: 75 CAPSULE, EXTENDED RELEASE ORAL at 08:11

## 2023-11-18 RX ADMIN — DORZOLAMIDE HYDROCHLORIDE 1 DROP: 20 SOLUTION/ DROPS OPHTHALMIC at 08:11

## 2023-11-18 RX ADMIN — INSULIN ASPART 8 UNITS: 100 INJECTION, SOLUTION INTRAVENOUS; SUBCUTANEOUS at 04:11

## 2023-11-18 RX ADMIN — MEROPENEM 1 G: 1 INJECTION, POWDER, FOR SOLUTION INTRAVENOUS at 05:11

## 2023-11-18 RX ADMIN — MEROPENEM 1 G: 1 INJECTION, POWDER, FOR SOLUTION INTRAVENOUS at 08:11

## 2023-11-18 RX ADMIN — INSULIN ASPART 2 UNITS: 100 INJECTION, SOLUTION INTRAVENOUS; SUBCUTANEOUS at 07:11

## 2023-11-18 RX ADMIN — COLLAGENASE SANTYL: 250 OINTMENT TOPICAL at 08:11

## 2023-11-18 RX ADMIN — MIRTAZAPINE 15 MG: 15 TABLET, FILM COATED ORAL at 08:11

## 2023-11-18 RX ADMIN — INSULIN ASPART 8 UNITS: 100 INJECTION, SOLUTION INTRAVENOUS; SUBCUTANEOUS at 12:11

## 2023-11-18 RX ADMIN — PANTOPRAZOLE SODIUM 40 MG: 40 TABLET, DELAYED RELEASE ORAL at 08:11

## 2023-11-18 RX ADMIN — LATANOPROST 1 DROP: 50 SOLUTION OPHTHALMIC at 08:11

## 2023-11-18 RX ADMIN — INSULIN ASPART 8 UNITS: 100 INJECTION, SOLUTION INTRAVENOUS; SUBCUTANEOUS at 07:11

## 2023-11-18 RX ADMIN — DORZOLAMIDE HYDROCHLORIDE 1 DROP: 20 SOLUTION/ DROPS OPHTHALMIC at 02:11

## 2023-11-18 RX ADMIN — TRAZODONE HYDROCHLORIDE 100 MG: 50 TABLET ORAL at 08:11

## 2023-11-18 RX ADMIN — ATORVASTATIN CALCIUM 80 MG: 20 TABLET, FILM COATED ORAL at 08:11

## 2023-11-18 NOTE — PLAN OF CARE
Problem: Adult Inpatient Plan of Care  Goal: Plan of Care Review  Outcome: Ongoing, Progressing  Goal: Patient-Specific Goal (Individualized)  Outcome: Ongoing, Progressing  Goal: Absence of Hospital-Acquired Illness or Injury  Outcome: Ongoing, Progressing  Goal: Optimal Comfort and Wellbeing  Outcome: Ongoing, Progressing  Goal: Readiness for Transition of Care  Outcome: Ongoing, Progressing     Problem: Diabetes Comorbidity  Goal: Blood Glucose Level Within Targeted Range  Outcome: Ongoing, Progressing     Problem: Adjustment to Illness (Sepsis/Septic Shock)  Goal: Optimal Coping  Outcome: Ongoing, Progressing     Problem: Bleeding (Sepsis/Septic Shock)  Goal: Absence of Bleeding  Outcome: Ongoing, Progressing     Problem: Impaired Wound Healing  Goal: Optimal Wound Healing  Outcome: Ongoing, Progressing

## 2023-11-18 NOTE — SUBJECTIVE & OBJECTIVE
Interval History: Patient continues to improve.  Renal function recovering.    Review of Systems   Constitutional:  Negative for chills and fever.   Respiratory:  Negative for shortness of breath.    Cardiovascular:  Negative for chest pain.   Gastrointestinal:  Negative for abdominal pain, constipation, diarrhea, nausea and vomiting.   Genitourinary:  Negative for dysuria.     Objective:     Vital Signs (Most Recent):  Temp: 98.4 °F (36.9 °C) (11/18/23 0301)  Pulse: 78 (11/18/23 0630)  Resp: 20 (11/18/23 0630)  BP: 124/68 (11/18/23 0630)  SpO2: 96 % (11/18/23 0630) Vital Signs (24h Range):  Temp:  [97.7 °F (36.5 °C)-98.9 °F (37.2 °C)] 98.4 °F (36.9 °C)  Pulse:  [] 78  Resp:  [10-32] 20  SpO2:  [91 %-100 %] 96 %  BP: ()/(43-76) 124/68     Weight: 96.1 kg (211 lb 13.8 oz)  Body mass index is 26.48 kg/m².    Intake/Output Summary (Last 24 hours) at 11/18/2023 0749  Last data filed at 11/18/2023 0603  Gross per 24 hour   Intake 1142.27 ml   Output 3852 ml   Net -2709.73 ml           Physical Exam  Cardiovascular:      Rate and Rhythm: Normal rate and regular rhythm.      Heart sounds: Normal heart sounds. No murmur heard.     No friction rub. No gallop.   Pulmonary:      Effort: Pulmonary effort is normal. No respiratory distress.      Breath sounds: Normal breath sounds. No wheezing or rales.   Abdominal:      General: Bowel sounds are normal. There is no distension.      Palpations: Abdomen is soft.      Tenderness: There is no abdominal tenderness.   Genitourinary:     Comments: Magana catheter in place with urine in Magana bag.  Musculoskeletal:         General: No tenderness. Normal range of motion.   Skin:     General: Skin is warm and dry.      Coloration: Skin is not pale.      Findings: No erythema or rash.      Comments: Wounds over his right below-the-knee amputation stump, foul-smelling but without induration   Neurological:      Mental Status: He is alert and oriented to person, place, and time.              Significant Labs: All pertinent labs within the past 24 hours have been reviewed.    Significant Imaging: I have reviewed all pertinent imaging results/findings within the past 24 hours.

## 2023-11-18 NOTE — PT/OT/SLP EVAL
Physical Therapy Evaluation    Patient Name:  Chi Mckeon   MRN:  [id number]    Recommendations:     Discharge Recommendations: TBD pending progress - Evaluation was limited as Pt refused to attempt to stand without his prosthesis.    Discharge Equipment Recommendations: walker, rolling, other (see comments) (.ROLLINGWALKER)   Barriers to discharge: Inaccessible home and Decreased caregiver support    Assessment:     Chi Mckeon is a 58 y.o. male admitted with a medical diagnosis of Severe sepsis.  He presents with the following impairments/functional limitations: impaired functional mobility, gait instability, impaired balance, decreased lower extremity function, impaired skin, edema. Orders received and Pt was evaluated by Physical Therapy.  Pt was able to transfer supine<>sit with Mod [place].  He refused to attempt to stand on his L LE with a RW without his prosthesis.  Pt is unable to use his prosthesis at this time secondary to anterior R knee wounds. .    Rehab Prognosis: Fair; patient would benefit from acute skilled PT services to address these deficits and reach maximum level of function.    Recent Surgery: * No surgery found *      Plan:     During this hospitalization, patient to be seen 5 x/week to address the identified rehab impairments via gait training, therapeutic activities, therapeutic exercises, neuromuscular re-education and progress toward the following goals:    Plan of Care Expires:  12/18/23    Subjective     Chief Complaint: Pt reported that he has pain in his R LE with using his prothesis  Patient/Family Comments/goals: Pt was agreeable to the evaluation, but refused to attempt to stand with a RW without his prosthesis  Pain/Comfort:  Pain Rating 1: 0/10  Location - Side 1: Right  Location 1: knee (At rest)  Pain Rating Post-Intervention 1: 0/10    Patients cultural, spiritual, Hinduism conflicts given the current situation: no    Living Environment:  Pt lives in a single  story home with 4 steps to enter with a ramp at the back door.  He lives with his wife.  Prior to admission, patients level of function was independent.  Equipment used at home: cane, straight, wheelchair, other (see comments), prosthesis (electric scooter).  DME owned (not currently used): none.  Upon discharge, patient will have assistance from his wife.    Objective:     Communicated with Nurse prior to session.  Patient found HOB elevated with bronson catheter, peripheral IV, telemetry  upon PT entry to room.    General Precautions: Standard, fall, diabetic  Orthopedic Precautions:N/A (Pt did not weight bear in his prosthesis secondary to anterior knee wounds)   Braces: N/A  Respiratory Status: Room air    Exams:  Cognitive Exam:  Patient is oriented to Person, Place, Time, and Situation  Sensation:    -       Intact  Skin Integrity/Edema:      -       Skin integrity: Pt with dressed, unobservable wounds at his anterior R knee  RLE ROM: Pt with BKA on the R. Knee and hip ROM are WFL  RLE Strength: Pt with BKA on the R.  Strength in knee and hip musculature are WFL  LLE ROM: WFL  LLE Strength: WFL    Functional Mobility:  Bed Mobility:     Rolling Right: modified independence  Supine to Sit: modified independence  Sit to Supine: modified independence      AM-PAC 6 CLICK MOBILITY  Total Score:13       Treatment & Education:  Pt performed bed mobility and B LE ROM.  We discussed how he presently moves about his home and outside of his home using a combination of his R BKA prosthesis and SPC, electric scooter and WC.  It was discussed that he may not be able to use his R BKA prosthesis at this time secondary to the wounds on his anterior R knee an may need to perform transfers using and RW without his prosthesis.  He refused to attempt to stand with a RW without his prosthesis.    Patient left HOB elevated with all lines intact, call button in reach, and Nurse notified.    GOALS:   Multidisciplinary Problems        Physical Therapy Goals          Problem: Physical Therapy    Goal Priority Disciplines Outcome Goal Variances Interventions   Physical Therapy Goal     PT, PT/OT Ongoing, Progressing     Description: Goals to be met by: 2023    Patient will increase functional independence with mobility by performin. Sit<>stand with CGA with RW.  2. Gait x 10 feet with RW with CGA.  3. Bed to chair transfer with lease restrictive assistive device independently.                          History:     Past Medical History:   Diagnosis Date    CHF (congestive heart failure)     Diabetes mellitus     type 2    Heart failure, unspecified     Hypertension     Unspecified glaucoma        Past Surgical History:   Procedure Laterality Date    AMPUTATION Right     below the knee of r side.  also amputation of L toe       Time Tracking:     PT Received On: 23  PT Start Time: 1148     PT Stop Time: 1223  PT Total Time (min): 35 min     Billable Minutes: Evaluation 15 and Therapeutic Activity 2023

## 2023-11-18 NOTE — PT/OT/SLP PROGRESS
Occupational Therapy      Patient Name:  Chi Mckeon   MRN:  [id number]    Patient not seen today secondary to adamant refusal even with education on purpose and benefits of occupational therapy.  Pt. Reporting that he is not in the hospital because he cannot walk or perform self-care, but because he cannot have a BM.  Discussed weakness that can occur with inactivity though with continued refusal.  Will follow-up tomorrow, 11/19/2023.    11/18/2023

## 2023-11-18 NOTE — ASSESSMENT & PLAN NOTE
Hemoglobin trending down however without overt bleeding.  Suspect drop in hemoglobin due to hemodilution.  Monitor for bleeding.  Transfuse as needed to maintain hemoglobin greater than 7 grams/deciliter.

## 2023-11-18 NOTE — ASSESSMENT & PLAN NOTE
Improving.  Culture urine positive for > 100,000 cfu/mL with gram negative rods.  Blood cultures remain negative to date.  Discontinue vancomycin now.  Continue gram negative coverage.  Wounds proximal stump evaluated by surgery service who feel wounds are not acutely infected at this time and does not need further surgical debridement.  Patient may have difficulty healing wounds due to peripheral vascular disease but wound not driving infection.  Continue Magana catheter as per Urology.  Stable to transfer to the floor.  Follow-up urine culture results.

## 2023-11-18 NOTE — NURSING TRANSFER
Patient received from ICU to the floor at around 10am via wheelchair.Orientation given regarding floor and room.Vital sign monitored and patient is stable in room air.Patient alert and oriented X4.Magana's intact.Patient has wound on Rt knee following fall before hospitalization as verbalized by patient.Wound care done.  Nurses Note -- 4 Eyes      11/18/2023   10:35 AM      Skin assessed during: Transfer      [] No Altered Skin Integrity Present    []Prevention Measures Documented      [x] Yes- Altered Skin Integrity Present or Discovered   [x] LDA Added if Not in Epic (Describe Wound)   [] New Altered Skin Integrity was Present on Admit and Documented in LDA   [] Wound Image Taken    Wound Care Consulted? No(Wound care already consulted)    Attending Nurse:  Eva Del Real RN/Staff Member:  Ann Marie

## 2023-11-18 NOTE — PROGRESS NOTES
"[facility] Intensive Care [facility] Medicine  Progress Note    Patient Name: Chi Mckeon  MRN: [id number]  Patient Class: IP- Inpatient   Admission Date: 11/16/2023  Length of Stay: 2 days  Attending Physician: Jony Villarreal MD  Primary Care Provider: Kip Devine MD        Subjective:     Principal Problem:Severe sepsis        HPI:  Per Bouchra Varma, NP:    "Mr. Mckeon is a 58 YOM with PMHx of combined systolic and diastolic CHF, CAD (RCA ), HTN, HLD, DM type II, PVD, s/p R BKA with chronic stump pain, GOMEZ, former smoker, and MDD/anxiety.     He presents to ED due to complaints of constipation, urinary frequency, decreased UOP, diffuse abdominal pain, chills/rigors, decreased appetite, decreased PO intake, N/V, HA, light headiness, dizziness, malaise, and generally feeling unwell. He notes constipation since 11/6 and other complaints have been present for a few days now. He reports malaise has been so significant he has been mostly bed bound this week. He denies SOB, SINHA, CP, diarrhea, constipation, seizures, or syncope. He notes compliance with home medications. He lives with spouse, is independent in ADLs, and uses prosthesis and can for ambulation. Of note he was recently admitted 11/6-8 due to hyperglycemia and ROXANNE.     In the ED initially he was slightly hypotensive, -120, and afebrile and received brown bomb enema with excellent result. He was given rocephin for concern of developing UTI based on UA at which time he became febrile with TMAX 100.7, tachycardic with -150's, and worsening hypotension. He was initiated on sepsis protocol with IVF hydration and improvement in HR and BP.     Initial workup up noted CBC with WBC 7, stable H/H. Chemistry with BUN 34, SCr 1.5 (baseline 1-1.2), glucose 265, anion gap 8. LFTs WNL. Lactic acid 1.46. CPK 43. Procalcitonin 2.12. UA concerning for infection. Hep C and HIV non reactive. Flu and Covid negative. Urine toxicology screen + " "for marijuana. EKG with ST[facility] without acute finding. CXR without acute cardiopulmonary process. CT A/P noted emphysematous cystitis and scattered liquid stool seen throughout the colon. Blood cultures and urine culture in process.     The patient was admitted to the Hospital Medicine Service for further evaluation and management."    Overview/Hospital Course:  Patient is a 58-year-old man with poorly controlled diabetes, chronic diastolic and systolic heart failure, peripheral vascular disease status post prior right below-the-knee amputation, prior tobacco smoker admitted the hospital with evidence of sepsis secondary urinary tract infection.    Patient volume resuscitated and started on broad-spectrum antibiotic therapy.  Patient also wound proximal to his right stump which has been present for over a month following prior fall for which she is receiving wound care.  Wound foul-smelling.  Surgery consult recommended further wound care and chemical debridement.    Interval History: Patient continues to improve.  Renal function recovering.    Review of Systems   Constitutional:  Negative for chills and fever.   Respiratory:  Negative for shortness of breath.    Cardiovascular:  Negative for chest pain.   Gastrointestinal:  Negative for abdominal pain, constipation, diarrhea, nausea and vomiting.   Genitourinary:  Negative for dysuria.     Objective:     Vital Signs (Most Recent):  Temp: 98.4 °F (36.9 °C) (11/18/23 0301)  Pulse: 78 (11/18/23 0630)  Resp: 20 (11/18/23 0630)  BP: 124/68 (11/18/23 0630)  SpO2: 96 % (11/18/23 0630) Vital Signs (24h Range):  Temp:  [97.7 °F (36.5 °C)-98.9 °F (37.2 °C)] 98.4 °F (36.9 °C)  Pulse:  [] 78  Resp:  [10-32] 20  SpO2:  [91 %-100 %] 96 %  BP: ()/(43-76) 124/68     Weight: 96.1 kg (211 lb 13.8 oz)  Body mass index is 26.48 kg/m².    Intake/Output Summary (Last 24 hours) at 11/18/2023 0797  Last data filed at 11/18/2023 0603  Gross per 24 hour   Intake 1142.27 ml "   Output 3852 ml   Net -2709.73 ml           Physical Exam  Cardiovascular:      Rate and Rhythm: Normal rate and regular rhythm.      Heart sounds: Normal heart sounds. No murmur heard.     No friction rub. No gallop.   Pulmonary:      Effort: Pulmonary effort is normal. No respiratory distress.      Breath sounds: Normal breath sounds. No wheezing or rales.   Abdominal:      General: Bowel sounds are normal. There is no distension.      Palpations: Abdomen is soft.      Tenderness: There is no abdominal tenderness.   Genitourinary:     Comments: Magana catheter in place with urine in Magana bag.  Musculoskeletal:         General: No tenderness. Normal range of motion.   Skin:     General: Skin is warm and dry.      Coloration: Skin is not pale.      Findings: No erythema or rash.      Comments: Wounds over his right below-the-knee amputation stump, foul-smelling but without induration   Neurological:      Mental Status: He is alert and oriented to person, place, and time.             Significant Labs: All pertinent labs within the past 24 hours have been reviewed.    Significant Imaging: I have reviewed all pertinent imaging results/findings within the past 24 hours.      Assessment/Plan:      * Severe sepsis  Improving.  Culture urine positive for > 100,000 cfu/mL with gram negative rods.  Blood cultures remain negative to date.  Discontinue vancomycin now.  Continue gram negative coverage.  Wounds proximal stump evaluated by surgery service who feel wounds are not acutely infected at this time and does not need further surgical debridement.  Patient may have difficulty healing wounds due to peripheral vascular disease but wound not driving infection.  Continue Magana catheter as per Urology.  Stable to transfer to the floor.  Follow-up urine culture results.    Acute kidney injury  Secondary to sepsis.  Resolving with treating infection.    Chronic combined systolic and diastolic heart failure  Volume status okay  right now.  Hold off further intravenous fluids.    Type 2 diabetes mellitus with hyperglycemia, with long-term current use of insulin  Poorly controlled.  Increase both long and short-acting insulin this morning.    S/P unilateral BKA (below knee amputation)  -history of  -chronic pain in stump  -PRN supportive care as needed  -fall precautions    Hypertension  Holding/reducing blood pressure medication due to sepsis and relative hypotension.  Blood pressures remained soft however has not need vasopressors today.  Monitor.    Iron deficiency anemia due to chronic blood loss  Hemoglobin trending down however without overt bleeding.  Suspect drop in hemoglobin due to hemodilution.  Monitor for bleeding.  Transfuse as needed to maintain hemoglobin greater than 7 grams/deciliter.    Atherosclerosis of coronary artery   of RCA  HLD  -chronic, no s/s of ACS at current  -continue home ASA and statin  -EKG PRN concerns  -monitor     Adjustment disorder with mixed anxiety and depressed mood  Reasonably controlled with current regimen.  Will continue with current regimen and continue to monitor.      VTE Risk Mitigation (From admission, onward)           Ordered     enoxaparin injection 40 mg  Daily         11/16/23 2023     IP VTE HIGH RISK PATIENT  Once         11/16/23 2023     Place sequential compression device  Until discontinued         11/16/23 2023                    Jony Villarreal MD  Department of Hospital Medicine   [facility] - Intensive Care ([place])

## 2023-11-18 NOTE — PLAN OF CARE
Problem: Physical Therapy  Goal: Physical Therapy Goal  Description: Goals to be met by: 2023    Patient will increase functional independence with mobility by performin. Sit<>stand with CGA with RW.  2. Gait x 10 feet with RW with CGA.  3. Bed to chair transfer with lease restrictive assistive device independently.     Outcome: Ongoing, Progressing   Orders received and Pt was evaluated by Physical Therapy.  Pt was able to transfer supine<>sit with Mod [place].  He refused to attempt to stand on his L LE with a RW without his prosthesis.  Pt is unable to use his prosthesis at this time secondary to anterior R knee wounds.   Pt's wife, Yocasta, phoned - states pt was recently put on rosuvastatin, (changed from simvastatin) and states rosuvastatin is too expensive - states if they keep having to pay this every month this will create an financial hardship for them - Yocasta is wondering if pt can go back to simvastatin or if there is a similar drug that is less expensive that will work the same - also states pt only has about 1 week's worth left of rosuvastatin - please check with Dr. Haney and return call

## 2023-11-19 VITALS
TEMPERATURE: 99 F | WEIGHT: 216.69 LBS | OXYGEN SATURATION: 97 % | DIASTOLIC BLOOD PRESSURE: 87 MMHG | HEIGHT: 75 IN | SYSTOLIC BLOOD PRESSURE: 151 MMHG | HEART RATE: 78 BPM | BODY MASS INDEX: 26.94 KG/M2 | RESPIRATION RATE: 16 BRPM

## 2023-11-19 LAB
ANION GAP SERPL CALC-SCNC: 5 MMOL/L (ref 8–16)
ANION GAP SERPL CALC-SCNC: 6 MMOL/L (ref 8–16)
BASOPHILS # BLD AUTO: 0.01 K/UL (ref 0–0.2)
BASOPHILS NFR BLD: 0.2 % (ref 0–1.9)
BUN SERPL-MCNC: 15 MG/DL (ref 6–20)
BUN SERPL-MCNC: 17 MG/DL (ref 6–20)
CALCIUM SERPL-MCNC: 8.8 MG/DL (ref 8.7–10.5)
CALCIUM SERPL-MCNC: 9.2 MG/DL (ref 8.7–10.5)
CHLORIDE SERPL-SCNC: 111 MMOL/L (ref 95–110)
CHLORIDE SERPL-SCNC: 112 MMOL/L (ref 95–110)
CO2 SERPL-SCNC: 18 MMOL/L (ref 23–29)
CO2 SERPL-SCNC: 18 MMOL/L (ref 23–29)
CREAT SERPL-MCNC: 0.9 MG/DL (ref 0.5–1.4)
CREAT SERPL-MCNC: 1.1 MG/DL (ref 0.5–1.4)
DIFFERENTIAL METHOD: ABNORMAL
EOSINOPHIL # BLD AUTO: 0.1 K/UL (ref 0–0.5)
EOSINOPHIL NFR BLD: 1.1 % (ref 0–8)
ERYTHROCYTE [DISTWIDTH] IN BLOOD BY AUTOMATED COUNT: 20 % (ref 11.5–14.5)
EST. GFR  (NO RACE VARIABLE): >60 ML/MIN/1.73 M^2
EST. GFR  (NO RACE VARIABLE): >60 ML/MIN/1.73 M^2
GLUCOSE SERPL-MCNC: 269 MG/DL (ref 70–110)
GLUCOSE SERPL-MCNC: 316 MG/DL (ref 70–110)
HCT VFR BLD AUTO: 26.6 % (ref 40–54)
HGB BLD-MCNC: 8.5 G/DL (ref 14–18)
IMM GRANULOCYTES # BLD AUTO: 0.04 K/UL (ref 0–0.04)
IMM GRANULOCYTES NFR BLD AUTO: 0.6 % (ref 0–0.5)
LYMPHOCYTES # BLD AUTO: 1.3 K/UL (ref 1–4.8)
LYMPHOCYTES NFR BLD: 21.3 % (ref 18–48)
MAGNESIUM SERPL-MCNC: 2.1 MG/DL (ref 1.6–2.6)
MCH RBC QN AUTO: 25.2 PG (ref 27–31)
MCHC RBC AUTO-ENTMCNC: 32 G/DL (ref 32–36)
MCV RBC AUTO: 79 FL (ref 82–98)
MONOCYTES # BLD AUTO: 0.5 K/UL (ref 0.3–1)
MONOCYTES NFR BLD: 8.6 % (ref 4–15)
NEUTROPHILS # BLD AUTO: 4.3 K/UL (ref 1.8–7.7)
NEUTROPHILS NFR BLD: 68.2 % (ref 38–73)
NRBC BLD-RTO: 0 /100 WBC
PLATELET # BLD AUTO: 271 K/UL (ref 150–450)
PMV BLD AUTO: 8.8 FL (ref 9.2–12.9)
POCT GLUCOSE: 263 MG/DL (ref 70–110)
POCT GLUCOSE: 273 MG/DL (ref 70–110)
POTASSIUM SERPL-SCNC: 5.1 MMOL/L (ref 3.5–5.1)
POTASSIUM SERPL-SCNC: 5.2 MMOL/L (ref 3.5–5.1)
RBC # BLD AUTO: 3.37 M/UL (ref 4.6–6.2)
SODIUM SERPL-SCNC: 135 MMOL/L (ref 136–145)
SODIUM SERPL-SCNC: 135 MMOL/L (ref 136–145)
WBC # BLD AUTO: 6.25 K/UL (ref 3.9–12.7)

## 2023-11-19 PROCEDURE — 36415 COLL VENOUS BLD VENIPUNCTURE: CPT | Performed by: PHYSICIAN ASSISTANT

## 2023-11-19 PROCEDURE — 99233 SBSQ HOSP IP/OBS HIGH 50: CPT | Mod: ,,, | Performed by: STUDENT IN AN ORGANIZED HEALTH CARE EDUCATION/TRAINING PROGRAM

## 2023-11-19 PROCEDURE — 36415 COLL VENOUS BLD VENIPUNCTURE: CPT | Performed by: HOSPITALIST

## 2023-11-19 PROCEDURE — 80048 BASIC METABOLIC PNL TOTAL CA: CPT | Performed by: PHYSICIAN ASSISTANT

## 2023-11-19 PROCEDURE — 83735 ASSAY OF MAGNESIUM: CPT | Performed by: PHYSICIAN ASSISTANT

## 2023-11-19 PROCEDURE — 25000003 PHARM REV CODE 250: Performed by: HOSPITALIST

## 2023-11-19 PROCEDURE — 85025 COMPLETE CBC W/AUTO DIFF WBC: CPT | Performed by: HOSPITALIST

## 2023-11-19 PROCEDURE — 99233 PR SUBSEQUENT HOSPITAL CARE,LEVL III: ICD-10-PCS | Mod: ,,, | Performed by: STUDENT IN AN ORGANIZED HEALTH CARE EDUCATION/TRAINING PROGRAM

## 2023-11-19 PROCEDURE — 80048 BASIC METABOLIC PNL TOTAL CA: CPT | Mod: 91 | Performed by: HOSPITALIST

## 2023-11-19 PROCEDURE — 25000003 PHARM REV CODE 250: Performed by: NURSE PRACTITIONER

## 2023-11-19 PROCEDURE — 63600175 PHARM REV CODE 636 W HCPCS: Performed by: NURSE PRACTITIONER

## 2023-11-19 RX ORDER — CARVEDILOL 3.12 MG/1
3.12 TABLET ORAL 2 TIMES DAILY
Qty: 60 TABLET | Refills: 0 | Status: SHIPPED | OUTPATIENT
Start: 2023-11-19

## 2023-11-19 RX ORDER — SEMAGLUTIDE 0.68 MG/ML
0.5 INJECTION, SOLUTION SUBCUTANEOUS
COMMUNITY
Start: 2023-10-30

## 2023-11-19 RX ORDER — INSULIN ASPART 100 [IU]/ML
8 INJECTION, SOLUTION INTRAVENOUS; SUBCUTANEOUS
Qty: 7.2 ML | Refills: 0 | Status: SHIPPED | OUTPATIENT
Start: 2023-11-19

## 2023-11-19 RX ORDER — TAMSULOSIN HYDROCHLORIDE 0.4 MG/1
0.4 CAPSULE ORAL DAILY
Qty: 30 CAPSULE | Refills: 0 | Status: SHIPPED | OUTPATIENT
Start: 2023-11-20 | End: 2023-12-04 | Stop reason: SDUPTHER

## 2023-11-19 RX ORDER — CARVEDILOL 3.12 MG/1
3.12 TABLET ORAL 2 TIMES DAILY
Status: DISCONTINUED | OUTPATIENT
Start: 2023-11-19 | End: 2023-11-19 | Stop reason: HOSPADM

## 2023-11-19 RX ORDER — BRIMONIDINE TARTRATE AND TIMOLOL MALEATE 2; 5 MG/ML; MG/ML
1 SOLUTION OPHTHALMIC 2 TIMES DAILY
COMMUNITY
Start: 2023-03-01

## 2023-11-19 RX ORDER — CIPROFLOXACIN 500 MG/1
500 TABLET ORAL EVERY 12 HOURS
Qty: 20 TABLET | Refills: 0 | Status: SHIPPED | OUTPATIENT
Start: 2023-11-19 | End: 2023-11-29

## 2023-11-19 RX ORDER — ACETAMINOPHEN 500 MG
1000 TABLET ORAL EVERY 8 HOURS PRN
Refills: 0 | COMMUNITY
Start: 2023-11-19

## 2023-11-19 RX ORDER — TAMSULOSIN HYDROCHLORIDE 0.4 MG/1
0.4 CAPSULE ORAL DAILY
Status: DISCONTINUED | OUTPATIENT
Start: 2023-11-19 | End: 2023-11-19 | Stop reason: HOSPADM

## 2023-11-19 RX ADMIN — INSULIN ASPART 8 UNITS: 100 INJECTION, SOLUTION INTRAVENOUS; SUBCUTANEOUS at 07:11

## 2023-11-19 RX ADMIN — ATORVASTATIN CALCIUM 80 MG: 20 TABLET, FILM COATED ORAL at 08:11

## 2023-11-19 RX ADMIN — FERROUS SULFATE TAB 325 MG (65 MG ELEMENTAL FE) 1 EACH: 325 (65 FE) TAB at 08:11

## 2023-11-19 RX ADMIN — INSULIN ASPART 8 UNITS: 100 INJECTION, SOLUTION INTRAVENOUS; SUBCUTANEOUS at 11:11

## 2023-11-19 RX ADMIN — TAMSULOSIN HYDROCHLORIDE 0.4 MG: 0.4 CAPSULE ORAL at 09:11

## 2023-11-19 RX ADMIN — MEROPENEM 1 G: 1 INJECTION, POWDER, FOR SOLUTION INTRAVENOUS at 05:11

## 2023-11-19 RX ADMIN — DORZOLAMIDE HYDROCHLORIDE 1 DROP: 20 SOLUTION/ DROPS OPHTHALMIC at 08:11

## 2023-11-19 RX ADMIN — GLYCOPYRROLATE 1 MG: 1 TABLET ORAL at 08:11

## 2023-11-19 RX ADMIN — MUPIROCIN: 20 OINTMENT TOPICAL at 08:11

## 2023-11-19 RX ADMIN — ASPIRIN 81 MG: 81 TABLET, COATED ORAL at 08:11

## 2023-11-19 RX ADMIN — COLLAGENASE SANTYL: 250 OINTMENT TOPICAL at 09:11

## 2023-11-19 RX ADMIN — INSULIN ASPART 3 UNITS: 100 INJECTION, SOLUTION INTRAVENOUS; SUBCUTANEOUS at 11:11

## 2023-11-19 RX ADMIN — CARVEDILOL 3.12 MG: 3.12 TABLET, FILM COATED ORAL at 09:11

## 2023-11-19 RX ADMIN — PANTOPRAZOLE SODIUM 40 MG: 40 TABLET, DELAYED RELEASE ORAL at 07:11

## 2023-11-19 RX ADMIN — INSULIN ASPART 3 UNITS: 100 INJECTION, SOLUTION INTRAVENOUS; SUBCUTANEOUS at 07:11

## 2023-11-19 RX ADMIN — SENNOSIDES 1 TABLET: 8.6 TABLET, FILM COATED ORAL at 08:11

## 2023-11-19 RX ADMIN — VENLAFAXINE HYDROCHLORIDE 150 MG: 75 CAPSULE, EXTENDED RELEASE ORAL at 08:11

## 2023-11-19 NOTE — SUBJECTIVE & OBJECTIVE
Interval History:  no problems with bronson, draining clear yellow urine    Review of Systems   Constitutional:  Negative for activity change, appetite change, chills, diaphoresis and fever.   HENT:  Negative for congestion and rhinorrhea.    Eyes:  Negative for visual disturbance.   Respiratory:  Negative for choking and shortness of breath.    Gastrointestinal:  Negative for abdominal distention, abdominal pain, constipation, diarrhea, nausea and vomiting.   Genitourinary:  Positive for difficulty urinating. Negative for dysuria, flank pain, hematuria, scrotal swelling, testicular pain and urgency.   Skin:  Negative for color change, pallor and wound.   Neurological:  Negative for dizziness and syncope.   Psychiatric/Behavioral:  Negative for confusion and hallucinations.      Objective:     Temp:  [98 °F (36.7 °C)-99 °F (37.2 °C)] 99 °F (37.2 °C)  Pulse:  [78-92] 78  Resp:  [16-18] 16  SpO2:  [97 %-99 %] 97 %  BP: (136-156)/(68-87) 151/87     Body mass index is 27.09 kg/m².      Bladder Scan Volume (mL): 270 mL (11/16/23 1431)    Drains       Drain  Duration                  Urethral Catheter 11/17/23 1308 1 day                     Physical Exam  Constitutional:       General: He is not in acute distress.     Appearance: He is well-developed. He is not ill-appearing, toxic-appearing or diaphoretic.   HENT:      Head: Normocephalic and atraumatic.      Mouth/Throat:      Mouth: Mucous membranes are moist.   Eyes:      Conjunctiva/sclera: Conjunctivae normal.   Pulmonary:      Effort: Pulmonary effort is normal. No respiratory distress.   Abdominal:      General: Abdomen is flat. There is no distension.   Genitourinary:     Comments: Bronson w/ clear yellow urine  Musculoskeletal:         General: Deformity present. No swelling.      Cervical back: Neck supple.   Skin:     General: Skin is warm.      Capillary Refill: Capillary refill takes less than 2 seconds.      Findings: No rash.   Neurological:      Mental  Status: He is alert and oriented to person, place, and time.      Gait: Gait normal.   Psychiatric:         Mood and Affect: Mood normal.         Thought Content: Thought content normal.         Judgment: Judgment normal.           Significant Labs:    BMP:  Recent Labs   Lab 11/17/23 0355 11/18/23  0640 11/19/23 0427    137 135*   K 4.9 4.7 5.2*   * 112* 111*   CO2 17* 18* 18*   BUN 29* 17 17   CREATININE 1.3 1.0 1.1   CALCIUM 8.6* 8.8 8.8       CBC:   Recent Labs   Lab 11/17/23  0355 11/17/23  0400 11/17/23  0443 11/18/23 0640 11/19/23 0427   WBC 13.63*  --   --  7.45 6.25   HGB 9.4*  --   --  8.8* 8.5*   HCT 29.5*   < > 25* 27.9* 26.6*     --   --  250 271    < > = values in this interval not displayed.

## 2023-11-19 NOTE — DISCHARGE SUMMARY
"[facility] Medicine  Discharge Summary      Patient Name: Chi Mckeon  MRN: [id number]  MITCH: [id number]  Patient Class: IP- Inpatient  Admission Date: 11/16/2023  Hospital Length of Stay: 3 days  Discharge Date and Time: 11/19/2023 12:22 PM  Attending Physician: Jony Villarreal MD   Discharging Provider: Jony Villarreal MD  Primary Care Provider: Kip Devine MD      HPI:   Polo Varma, NP:    "Mr. Mckeon is a 58 YOM with PMHx of combined systolic and diastolic CHF, CAD (RCA ), HTN, HLD, DM type II, PVD, s/p R BKA with chronic stump pain, GOMEZ, former smoker, and MDD/anxiety.     He presents to ED due to complaints of constipation, urinary frequency, decreased UOP, diffuse abdominal pain, chills/rigors, decreased appetite, decreased PO intake, N/V, HA, light headiness, dizziness, malaise, and generally feeling unwell. He notes constipation since 11/6 and other complaints have been present for a few days now. He reports malaise has been so significant he has been mostly bed bound this week. He denies SOB, SINHA, CP, diarrhea, constipation, seizures, or syncope. He notes compliance with home medications. He lives with spouse, is independent in ADLs, and uses prosthesis and can for ambulation. Of note he was recently admitted 11/6-8 due to hyperglycemia and ROXANNE.     In the ED initially he was slightly hypotensive, -120, and afebrile and received brown bomb enema with excellent result. He was given rocephin for concern of developing UTI based on UA at which time he became febrile with TMAX 100.7, tachycardic with -150's, and worsening hypotension. He was initiated on sepsis protocol with IVF hydration and improvement in HR and BP.     Initial workup up noted CBC with WBC 7, stable H/H. Chemistry with BUN 34, SCr 1.5 (baseline 1-1.2), glucose 265, anion gap 8. LFTs WNL. Lactic acid 1.46. CPK 43. Procalcitonin 2.12. UA concerning for infection. Hep C and HIV non " "reactive. Flu and Covid negative. Urine toxicology screen + for marijuana. EKG with ST[facility] without acute finding. CXR without acute cardiopulmonary process. CT A/P noted emphysematous cystitis and scattered liquid stool seen throughout the colon. Blood cultures and urine culture in process.     The patient was admitted to the Hospital Medicine Service for further evaluation and management."      Hospital Course:   Patient is a 58-year-old man with poorly controlled diabetes, chronic diastolic and systolic heart failure, peripheral vascular disease status post prior right below-the-knee amputation, prior tobacco smoker admitted the hospital with evidence of sepsis secondary urinary tract infection.    Patient volume resuscitated and started on broad-spectrum antibiotic therapy.  Patient's hold blood pressure medications held due t hypotension. Patient also wound proximal to his right stump which has been present for over a month following prior fall for which he is receiving wound care.  Wound foul-smelling.  Surgery consult recommended further wound care and chemical debridement but no surgical debridement at this time.    Blood pressure recovered and he was started on lower dose of blood pressure medication and also avoiding medication that can cause hyperkalemia due to mild hyperkalemia noted during this hospital stay.  Repeat serum potassium within normal range.    Urology recommended that patient maintain Magana catheter pending outpatient clinic follow-up for voiding trial.  Patient prescribed an additional 10 days (14 days of treatment total) of antibiotic therapy.  Risks of antibiotic therapy discussed.    Empagliflozin (Jardiance) during hospital stay and on discharge due to urinary tract infection.  Reasonable to restart after infection resolves.  I also holding metformin due to acute kidney injury on presentation likely due to infection/hypotension.  Might be reasonable to restart metformin as pending " repeat outpatient chemistry.     I also recommend patient have home health due to history of medication mismanagement.  Patient instructed to only take medication as currently prescribed for now and on the importance of close clinic follow-up for ongoing management of his co-morbidities and to help with wound care.     Goals of Care Treatment Preferences:  Code Status: Full Code      Consults:   Consults (From admission, onward)          Status Ordering Provider     Inpatient consult to Urology  Once        Provider:  Rupesh Ribera MD    Completed SNEHAL SUBRAMANIAN     Inpatient consult to General Surgery  Once        Provider:  Renetta Xiao MD    Completed SNEHAL SUBRAMANIAN              Final Active Diagnoses:    Diagnosis Date Noted POA    PRINCIPAL PROBLEM:  Severe sepsis [A41.9, R65.20] 11/16/2023 Yes    Acute kidney injury [N17.9] 11/06/2023 Yes    Emphysematous cystitis [N30.80] 11/16/2023 Yes    Abdominal pain [R10.9] 11/16/2023 Unknown    Constipation [K59.00] 11/16/2023 Unknown    Chronic total occlusion of native coronary artery [I25.10, I25.82] 11/16/2023 Unknown    Chronic combined systolic and diastolic heart failure [I50.42] 11/06/2023 Yes    Iron deficiency anemia due to chronic blood loss [D50.0] 11/06/2023 Yes    Adjustment disorder with mixed anxiety and depressed mood [F43.23] 12/13/2018 Yes    HLD (hyperlipidemia) [E78.5] 06/01/2015 Yes    S/P unilateral BKA (below knee amputation) [Z89.519] 06/09/2014 Not Applicable    Atherosclerosis of coronary artery [I25.10] 02/26/2013 Yes    Hypertension [I10] 06/07/2010 Yes    Type 2 diabetes mellitus with hyperglycemia, with long-term current use of insulin [E11.65, Z79.4] 06/10/2008 Not Applicable      Problems Resolved During this Admission:       Discharged Condition: Stable    Disposition: Home-Health Care [facility] [place]    Follow Up:   Follow-up Information       Kip Devine MD. Schedule an appointment as soon as possible for a visit in 1 week(s).     Specialty: Internal Medicine  Why: Repeat blood work  Contact information:  [address], [address]  [place] [address] [phone]                              Medications:  Reconciled Home Medications:      Medication List        START taking these medications      acetaminophen 500 MG tablet  Commonly known as: TYLENOL  Take 2 tablets (1,000 mg total) by mouth every 8 (eight) hours as needed for Pain.     ciprofloxacin HCl 500 MG tablet  Commonly known as: CIPRO  Take 1 tablet (500 mg total) by mouth every 12 (twelve) hours. for 10 days     collagenase ointment  Commonly known as: SANTYL  Apply topically once daily.  Start taking on: November 20, 2023     insulin detemir U-100 (Levemir) 100 unit/mL (3 mL) Inpn pen  Inject 30 Units into the skin 2 (two) times daily.     polyethylene glycol 17 gram Pwpk  Commonly known as: GLYCOLAX  Take 17 g by mouth once daily.     tamsulosin 0.4 mg Cap  Commonly known as: FLOMAX  Take 1 capsule (0.4 mg total) by mouth once daily.  Start taking on: November 20, 2023            CHANGE how you take these medications      carvediloL 3.125 MG tablet  Commonly known as: COREG  Take 1 tablet (3.125 mg total) by mouth 2 (two) times daily.  What changed:   medication strength  how much to take  when to take this     COMBIGAN 0.2-0.5 % Drop  Generic drug: brimonidine-timoloL  Place 1 drop into both eyes 2 (two) times a day.  What changed: Another medication with the same name was removed. Continue taking this medication, and follow the directions you see here.     NovoLOG Flexpen U-100 Insulin 100 unit/mL (3 mL) Inpn pen  Generic drug: insulin aspart U-100  Inject 8 Units into the skin 3 (three) times daily with meals.  What changed: See the new instructions.     OZEMPIC 0.25 mg or 0.5 mg (2 mg/3 mL) pen injector  Generic drug: semaglutide  Inject 0.5 mg into the skin every Wednesday.  What changed: Another medication with the same name was removed. Continue taking this  "medication, and follow the directions you see here.            CONTINUE taking these medications      aspirin 81 MG EC tablet  Commonly known as: ECOTRIN  Take 1 tablet by mouth once daily.     dorzolamide 2 % ophthalmic solution  Commonly known as: TRUSOPT  INSTILL 1 DROPS INTO EACH EYE THREE TIMES DAILY     FeroSuL 325 mg (65 mg iron) Tab tablet  Generic drug: ferrous sulfate  Take by mouth every other day.     latanoprost 0.005 % ophthalmic solution  INSTILL 1 DROP INTO EACH EYE ONCE DAILY AT NIGHT     mirtazapine 15 MG tablet  Commonly known as: REMERON  Take 15 mg by mouth every evening.     pantoprazole 40 MG tablet  Commonly known as: PROTONIX  Take 40 mg by mouth every morning.     rosuvastatin 40 MG Tab  Commonly known as: CRESTOR  Take 40 mg by mouth once daily.     senna 8.6 mg tablet  Commonly known as: SENOKOT  Take 1 tablet by mouth once daily.     traZODone 50 MG tablet  Commonly known as: DESYREL  Take 100 mg by mouth nightly.     venlafaxine 150 MG Cp24  Commonly known as: EFFEXOR-XR  Take 150 mg by mouth once daily.            STOP taking these medications      blood sugar diagnostic Strp     blood-glucose meter kit     FLUoxetine 20 MG capsule     furosemide 40 MG tablet  Commonly known as: LASIX     glycopyrrolate 1 mg Tab  Commonly known as: ROBINUL     JARDIANCE 10 mg tablet  Generic drug: empagliflozin     lancets 30 gauge Misc     LANTUS SOLOSTAR U-100 INSULIN glargine 100 units/mL SubQ pen  Generic drug: insulin     lisinopriL 40 MG tablet  Commonly known as: PRINIVIL,ZESTRIL     metFORMIN 1000 MG tablet  Commonly known as: GLUCOPHAGE     pen needle, diabetic 32 gauge x 5/32" Ndle     spironolactone 25 MG tablet  Commonly known as: ALDACTONE              Indwelling Lines/Drains at time of discharge:   Lines/Drains/Airways       Drain  Duration                  Urethral Catheter 11/17/23 1308 2 days                    Time spent on the discharge of patient: 45 minutes         Jony Villarreal, " MD  Department of Hospital Medicine  [facility] - Med Surg ([place])

## 2023-11-19 NOTE — ASSESSMENT & PLAN NOTE
Chi Mckeon is a 58 y.o.M admitted for sepsis with emphysematous cystitis.     Continue broad spectrum IV abx, tailor to cultures. Klebsiella, x 14 day course for complicated UTI advised  Risk factor for emphysematous cystitis is uncontrolled DM, last A1C 14, 1 month ago   Patient previously on Flomax. Recommend restarting and resuming upon discharge   Maintain bronson upon discharge. Will arrange outpatient urology follow up for removal.   Rest of care per primary  Strict glucose control advised

## 2023-11-19 NOTE — PLAN OF CARE
11/19/23 1059   Final Note   Assessment Type Final Discharge Note   Anticipated Discharge Disposition Home-Health   Hospital Resources/Appts/Education Provided Provided patient/caregiver with written discharge plan information;Appointments scheduled and added to AVS   Post-Acute Status   Discharge Delays None known at this time         Patient will discharge home with family.     Patient family will provide transportation home.     Patient has no preference of home health agency.     Patient will discharge with home health.     Patient discharge needs & orders were addressed from a SW standpoint.

## 2023-11-19 NOTE — PLAN OF CARE
[facility] ([place])  HOME HEALTH ORDERS  FACE TO FACE ENCOUNTER    Patient Name: Chi Mckeon  YOB: 1965  PCP: Kip Devine MD   PCP Address: [address] / [place] [address]  PCP Phone Number: [phone]  PCP Fax: [phone]    Encounter Date: 11/16/23    Admit to Home Health    Diagnoses:  Active Hospital Problems    Diagnosis  POA    *Severe sepsis [A41.9, R65.20]  Yes     Priority: 1 - High    Acute kidney injury [N17.9]  Yes     Priority: 3     Emphysematous cystitis [N30.80]  Yes    Abdominal pain [R10.9]  Unknown    Constipation [K59.00]  Unknown    Chronic total occlusion of native coronary artery [I25.10, I25.82]  Unknown    Chronic combined systolic and diastolic heart failure [I50.42]  Yes    Iron deficiency anemia due to chronic blood loss [D50.0]  Yes    Adjustment disorder with mixed anxiety and depressed mood [F43.23]  Yes    HLD (hyperlipidemia) [E78.5]  Yes     Last Assessment & Plan:   Formatting of this note is different from the original.  Lab Results   Component Value Date    CHOL 85 02/01/2017    CHOL 79 01/25/2016    CHOL 204 (H) 05/11/2015     Lab Results   Component Value Date    TRIG 77 02/01/2017    TRIG 70 01/25/2016    TRIG 132 05/11/2015     Lab Results   Component Value Date    HDL 30 (L) 02/01/2017    HDL 27 (L) 01/25/2016    HDL 41 05/11/2015     Lab Results   Component Value Date    LDLCALC 40 02/01/2017    LDLCALC 38 01/25/2016    LDLCALC 137 (H) 05/11/2015     Lab Results   Component Value Date    NONHDLC 55 02/01/2017     Lab Results   Component Value Date    CHOLHDL 2.8 02/01/2017    CHOLHDL 2.9 01/25/2016    CHOLHDL 5.0 05/11/2015     No change to statin      S/P unilateral BKA (below knee amputation) [Z89.519]  Not Applicable     Last Assessment & Plan:   Formatting of this note might be different from the original.  Still with stump pain, tramadol ok until seenby ortho needs orthho appt      Atherosclerosis of coronary artery  [I25.10]  Yes     dx update  Formatting of this note might be different from the original.  dx update    Last Assessment & Plan:   Formatting of this note might be different from the original.  On asa, statin, coreg, ace-i, will make sure has cards follow up within next 6 months, established with [facility] cards  No chest pain or sob      Hypertension [I10]  Yes     dx update  Formatting of this note might be different from the original.  dx update    Last Assessment & Plan:   Formatting of this note might be different from the original.  At goal, no change      Type 2 diabetes mellitus with hyperglycemia, with long-term current use of insulin [E11.65, Z79.4]  Not Applicable     dx update        Resolved Hospital Problems   No resolved problems to display.       Follow Up Appointments:  Future Appointments   Date Time Provider Department Center   11/22/2023  8:00 AM Bobby Lazo NP [facility] [place]       Allergies:Review of patient's allergies indicates:  No Known Allergies    Medications: Review discharge medications with patient and family and provide education.      Current Discharge Medication List        START taking these medications    Details   acetaminophen (TYLENOL) 500 MG tablet Take 2 tablets (1,000 mg total) by mouth every 8 (eight) hours as needed for Pain.  Refills: 0      ciprofloxacin HCl (CIPRO) 500 MG tablet Take 1 tablet (500 mg total) by mouth every 12 (twelve) hours. for 10 days  Qty: 20 tablet, Refills: 0      collagenase (SANTYL) ointment Apply topically once daily.  Qty: 30 g, Refills: 0      insulin detemir U-100, Levemir, 100 unit/mL (3 mL) SubQ InPn pen Inject 30 Units into the skin 2 (two) times daily.  Qty: 18 mL, Refills: 0      polyethylene glycol (GLYCOLAX) 17 gram PwPk Take 17 g by mouth once daily.  Qty: 100 each, Refills: 0      tamsulosin (FLOMAX) 0.4 mg Cap Take 1 capsule (0.4 mg total) by mouth once daily.  Qty: 30 capsule, Refills: 0           CONTINUE these medications which  have CHANGED    Details   carvediloL (COREG) 3.125 MG tablet Take 1 tablet (3.125 mg total) by mouth 2 (two) times daily.  Qty: 60 tablet, Refills: 0      NOVOLOG FLEXPEN U-100 INSULIN 100 unit/mL (3 mL) InPn pen Inject 8 Units into the skin 3 (three) times daily with meals.  Qty: 7.2 mL, Refills: 0           CONTINUE these medications which have NOT CHANGED    Details   brimonidine-timoloL (COMBIGAN) 0.2-0.5 % Drop Place 1 drop into both eyes 2 (two) times a day.      OZEMPIC 0.25 mg or 0.5 mg (2 mg/3 mL) pen injector Inject 0.5 mg into the skin every Wednesday.      aspirin (ECOTRIN) 81 MG EC tablet Take 1 tablet by mouth once daily.      dorzolamide (TRUSOPT) 2 % ophthalmic solution INSTILL 1 DROPS INTO EACH EYE THREE TIMES DAILY      FEROSUL 325 mg (65 mg iron) Tab tablet Take by mouth every other day.      latanoprost 0.005 % ophthalmic solution INSTILL 1 DROP INTO EACH EYE ONCE DAILY AT NIGHT      mirtazapine (REMERON) 15 MG tablet Take 15 mg by mouth every evening.      pantoprazole (PROTONIX) 40 MG tablet Take 40 mg by mouth every morning.      rosuvastatin (CRESTOR) 40 MG Tab Take 40 mg by mouth once daily.      senna (SENOKOT) 8.6 mg tablet Take 1 tablet by mouth once daily.      traZODone (DESYREL) 50 MG tablet Take 100 mg by mouth nightly.      venlafaxine (EFFEXOR-XR) 150 MG Cp24 Take 150 mg by mouth once daily.           STOP taking these medications       blood sugar diagnostic Strp Comments:   Reason for Stopping:         blood sugar diagnostic Strp Comments:   Reason for Stopping:         blood-glucose meter kit Comments:   Reason for Stopping:         FLUoxetine 20 MG capsule Comments:   Reason for Stopping:         furosemide (LASIX) 40 MG tablet Comments:   Reason for Stopping:         glycopyrrolate (ROBINUL) 1 mg Tab Comments:   Reason for Stopping:         JARDIANCE 10 mg tablet Comments:   Reason for Stopping:         lancets 30 gauge Misc Comments:   Reason for Stopping:         LANTUS  "SOLOSTAR U-100 INSULIN glargine 100 units/mL SubQ pen Comments:   Reason for Stopping:         lisinopriL (PRINIVIL,ZESTRIL) 40 MG tablet Comments:   Reason for Stopping:         metFORMIN (GLUCOPHAGE) 1000 MG tablet Comments:   Reason for Stopping:         OZEMPIC 0.25 mg or 0.5 mg(2 mg/1.5 mL) pen injector Comments:   Reason for Stopping:         pen needle, diabetic 32 gauge x 5/32" Ndle Comments:   Reason for Stopping:         spironolactone (ALDACTONE) 25 MG tablet Comments:   Reason for Stopping:                 I have seen and examined this patient within the last 30 days. My clinical findings that support the need for the home health skilled services and home bound status are the following:no   Patient with medication mismanagement issues requiring home bound status as evidenced by  Unstable vital signs (blood pressure, heart rate), Poor understanding of medication regimen/dosage, Poor adherence to medication regimen/dosage, and Uncontrolled hyperglycemic/hypoglycemic events.     Diet:   cardiac diet and 2 gram sodium diet    Referrals/ Consults   to evaluate for community resources/long-range planning.  Aide to provide assistance with personal care, ADLs, and vital signs.    Activities:   activity as tolerated    Nursing:   Agency to admit patient within 24 hours of hospital discharge unless specified on physician order or at patient request    SN to complete comprehensive assessment including routine vital signs. Instruct on disease process and s/s of complications to report to MD. Review/verify medication list sent home with the patient at time of discharge  and instruct patient/caregiver as needed. Frequency may be adjusted depending on start of care date.     Skilled nurse to perform up to 3 visits PRN for symptoms related to diagnosis    Notify MD if SBP > 160 or < 90; DBP > 90 or < 50; HR > 120 or < 50; Temp > 101; O2 < 88%    Ok to schedule additional visits based on staff availability " and patient request on consecutive days within the home health episode.    For all post-discharge communication and subsequent orders please contact patient's primary care physician.    Magana Care: Instruct patient/caregiver to empty Magana bag.  Change Magana every month.    Skin Care:  Routine Skin for Patients with Limited Mobility: Instruct patient/caregiver to apply moisture barrier cream to all skin folds and wet areas in perineal area daily and after baths and all bowel movements.      Diabetic Care:       Fingerstick blood sugar AC and HS and Report CBG < 60 or > 350 to physician.  Fingerstick blood sugar AC and HS, check every 6 hours if NPO or if unable to eat.    Give scheduled short-acting insulin plus additional insulin based on following sliding scale:    For FSG , do nothing. For -200, give 1 unit of novolog in addition to pre-meal insulin.   For -250, give 2 units of insulin aspart in addition to pre-meal insulin.   For -300, give 3 units of insulin aspart in addition to pre-meal insulin.   For -350, give 4 units of insulin aspart in addition to pre-meal insulin.   For -400, give 5 units of insulin aspart in addition to pre-meal insulin.   For FSG >400, give 5 units of insulin aspart in addition to pre-meal insulin and please call MD    Hypoglycemia Protocol:   For FSG <80, give 1/2 amp D50 or 1 glucose tablet and call MD    Heart Failure:      SN to instruct on the following:    Instruct on the definition of CHF.   Instruct on the signs/sympoms of CHF to be reported.   Instruct on and monitor daily weights.   Instruct on factors that cause exacerbation.   Instruct on action, dose, schedule, and side effects of medications.   Instruct on diet as prescribed.   Instruct on activity allowed.   Instruct on life-style modifications for life long management of CHF   SN to assess compliance with daily weights, diet, medications, fluid retention,    safety precautions,  activities permitted and life-style modifications.   Additional 1-2 SN visits per week as needed for signs and symptoms     of CHF exacerbation.      For Weight Gain > 2-3 lbs in 1 day or 4-6 lbs over 1 week notify PCP/Cardiologist.    Home Health Aide:  Nursing Twice weekly, Medical Social Work Weekly, and Home Health Aide Twice weekly    Wound Care: yes:  Wound above right knee/stump  Cleanse with Vashe and apply a nickel thick layer of santyl ointment to open wound beds. Cover with Mepore bordered gauze dressing. Change daily.       I certify that this patient is confined to his home and needs intermittent skilled nursing care.

## 2023-11-19 NOTE — PT/OT/SLP PROGRESS
Occupational Therapy      Patient Name:  Chi Mckeon   MRN:  [id number]    Patient not seen today secondary to Patient unwilling to participate. Second attempt for evaluation (pt declined 11/18/23). Pt declining need for therapy. OT educated pt on purpose of OT and evaluation, especially as pt with potential change in functional status (currently not able to wear R LE prosthesis 2/2 wound), potentially altering independence/safety with ADL/mobility. Pt continued to decline despite max education/encouragement. OT to sign of at this time 2/2 pt declining evaluation x two attempts. Pt educated to request physician to re-order OT if wanting to participate. Pt verbalized understanding. Please re-consult if OT indicated and pt willing to participate.     11/19/2023

## 2023-11-19 NOTE — PLAN OF CARE
Patient is AOX4, his VS was stable on RA. He tolerated IV antibiotics well. He had no significant event during the shift. Will continue to monitor.  Problem: Adult Inpatient Plan of Care  Goal: Plan of Care Review  Outcome: Ongoing, Progressing  Goal: Patient-Specific Goal (Individualized)  Outcome: Ongoing, Progressing  Goal: Absence of Hospital-Acquired Illness or Injury  Outcome: Ongoing, Progressing  Goal: Optimal Comfort and Wellbeing  Outcome: Ongoing, Progressing  Goal: Readiness for Transition of Care  Outcome: Ongoing, Progressing     Problem: Diabetes Comorbidity  Goal: Blood Glucose Level Within Targeted Range  Outcome: Ongoing, Progressing     Problem: Adjustment to Illness (Sepsis/Septic Shock)  Goal: Optimal Coping  Outcome: Ongoing, Progressing     Problem: Adjustment to Illness (Sepsis/Septic Shock)  Goal: Optimal Coping  Outcome: Ongoing, Progressing     Problem: Bleeding (Sepsis/Septic Shock)  Goal: Absence of Bleeding  Outcome: Ongoing, Progressing     Problem: Glycemic Control Impaired (Sepsis/Septic Shock)  Goal: Blood Glucose Level Within Desired Range  Outcome: Ongoing, Progressing     Problem: Infection Progression (Sepsis/Septic Shock)  Goal: Absence of Infection Signs and Symptoms  Outcome: Ongoing, Progressing     Problem: Nutrition Impaired (Sepsis/Septic Shock)  Goal: Optimal Nutrition Intake  Outcome: Ongoing, Progressing     Problem: Fluid and Electrolyte Imbalance (Acute Kidney Injury/Impairment)  Goal: Fluid and Electrolyte Balance  Outcome: Ongoing, Progressing     Problem: Oral Intake Inadequate (Acute Kidney Injury/Impairment)  Goal: Optimal Nutrition Intake  Outcome: Ongoing, Progressing

## 2023-11-19 NOTE — PLAN OF CARE
Patient educated on discharge instructions including bronson's catheter care at home and verbalized understanding.  All questions answered to patient's satisfaction.  IV removed without complication. Patient to be transported off unit via wheelchair.  Problem: Adult Inpatient Plan of Care  Goal: Plan of Care Review  Outcome: Met  Goal: Patient-Specific Goal (Individualized)  Outcome: Met  Goal: Absence of Hospital-Acquired Illness or Injury  Outcome: Met  Goal: Optimal Comfort and Wellbeing  Outcome: Met  Goal: Readiness for Transition of Care  Outcome: Met     Problem: Diabetes Comorbidity  Goal: Blood Glucose Level Within Targeted Range  Outcome: Met     Problem: Adjustment to Illness (Sepsis/Septic Shock)  Goal: Optimal Coping  Outcome: Met     Problem: Bleeding (Sepsis/Septic Shock)  Goal: Absence of Bleeding  Outcome: Met     Problem: Glycemic Control Impaired (Sepsis/Septic Shock)  Goal: Blood Glucose Level Within Desired Range  Outcome: Met     Problem: Infection Progression (Sepsis/Septic Shock)  Goal: Absence of Infection Signs and Symptoms  Outcome: Met     Problem: Nutrition Impaired (Sepsis/Septic Shock)  Goal: Optimal Nutrition Intake  Outcome: Met     Problem: Fluid and Electrolyte Imbalance (Acute Kidney Injury/Impairment)  Goal: Fluid and Electrolyte Balance  Outcome: Met     Problem: Oral Intake Inadequate (Acute Kidney Injury/Impairment)  Goal: Optimal Nutrition Intake  Outcome: Met     Problem: Renal Function Impairment (Acute Kidney Injury/Impairment)  Goal: Effective Renal Function  Outcome: Met     Problem: Impaired Wound Healing  Goal: Optimal Wound Healing  Outcome: Met     Problem: Skin Injury Risk Increased  Goal: Skin Health and Integrity  Outcome: Met     Problem: Infection  Goal: Absence of Infection Signs and Symptoms  Outcome: Met

## 2023-11-19 NOTE — PROGRESS NOTES
[facility])  Urology  Progress Note    Patient Name: Chi Mckeon  MRN: [id number]  Admission Date: 11/16/2023  Hospital Length of Stay: 3 days  Code Status: Full Code   Attending Provider: Jony Villarreal MD   Primary Care Physician: Kip Devine MD    Subjective:     HPI:  Chi Mckeon is a 58 y.o.M recently admitted for hyperglycemia and ROXANNE and discharged on 11/08. He presented to the ED complaining of constipation since discharge and decreased urine output. He was found to be febrile and tachycardic and is now admitted for sepsis. Urology consulted for emphysematous cystitis.     On assessment, AFVSS. WBC 13.63, Hgb 9.4, Cr 1.3. UA with many bacteria, concerning for infection. Culture pending. CT revealed intramural and intraluminal air in the bladder, concerning for emphysematous cystitis. Patient without Bronson catheter at time of assessment. He denies prior issues voiding. He was on Flomax for nocturia in the past but discontinued it citing urinary frequency. He denies dysuria, frequency, urgency, or hematuria.        Interval History:  no problems with bronson, draining clear yellow urine    Review of Systems   Constitutional:  Negative for activity change, appetite change, chills, diaphoresis and fever.   HENT:  Negative for congestion and rhinorrhea.    Eyes:  Negative for visual disturbance.   Respiratory:  Negative for choking and shortness of breath.    Gastrointestinal:  Negative for abdominal distention, abdominal pain, constipation, diarrhea, nausea and vomiting.   Genitourinary:  Positive for difficulty urinating. Negative for dysuria, flank pain, hematuria, scrotal swelling, testicular pain and urgency.   Skin:  Negative for color change, pallor and wound.   Neurological:  Negative for dizziness and syncope.   Psychiatric/Behavioral:  Negative for confusion and hallucinations.      Objective:     Temp:  [98 °F (36.7 °C)-99 °F (37.2 °C)] 99 °F (37.2 °C)  Pulse:  [78-92] 78  Resp:   [16-18] 16  SpO2:  [97 %-99 %] 97 %  BP: (136-156)/(68-87) 151/87     Body mass index is 27.09 kg/m².      Bladder Scan Volume (mL): 270 mL (11/16/23 1431)    Drains       Drain  Duration                  Urethral Catheter 11/17/23 1308 1 day                     Physical Exam  Constitutional:       General: He is not in acute distress.     Appearance: He is well-developed. He is not ill-appearing, toxic-appearing or diaphoretic.   HENT:      Head: Normocephalic and atraumatic.      Mouth/Throat:      Mouth: Mucous membranes are moist.   Eyes:      Conjunctiva/sclera: Conjunctivae normal.   Pulmonary:      Effort: Pulmonary effort is normal. No respiratory distress.   Abdominal:      General: Abdomen is flat. There is no distension.   Genitourinary:     Comments: Magana w/ clear yellow urine  Musculoskeletal:         General: Deformity present. No swelling.      Cervical back: Neck supple.   Skin:     General: Skin is warm.      Capillary Refill: Capillary refill takes less than 2 seconds.      Findings: No rash.   Neurological:      Mental Status: He is alert and oriented to person, place, and time.      Gait: Gait normal.   Psychiatric:         Mood and Affect: Mood normal.         Thought Content: Thought content normal.         Judgment: Judgment normal.           Significant Labs:    BMP:  Recent Labs   Lab 11/17/23  0355 11/18/23  0640 11/19/23  0427    137 135*   K 4.9 4.7 5.2*   * 112* 111*   CO2 17* 18* 18*   BUN 29* 17 17   CREATININE 1.3 1.0 1.1   CALCIUM 8.6* 8.8 8.8       CBC:   Recent Labs   Lab 11/17/23  0355 11/17/23  0400 11/17/23  0443 11/18/23  0640 11/19/23  0427   WBC 13.63*  --   --  7.45 6.25   HGB 9.4*  --   --  8.8* 8.5*   HCT 29.5*   < > 25* 27.9* 26.6*     --   --  250 271    < > = values in this interval not displayed.             Assessment/Plan:     Emphysematous cystitis  Chi Mckeon is a 58 y.o.M admitted for sepsis with emphysematous cystitis.     Continue broad  spectrum IV abx, tailor to cultures. Klebsiella, x 14 day course for complicated UTI advised  Risk factor for emphysematous cystitis is uncontrolled DM, last A1C 14, 1 month ago   Patient previously on Flomax. Recommend restarting and resuming upon discharge   Maintain bronson upon discharge. Will arrange outpatient urology follow up for removal.   Rest of care per primary  Strict glucose control advised        VTE Risk Mitigation (From admission, onward)           Ordered     enoxaparin injection 40 mg  Daily         11/16/23 2023     IP VTE HIGH RISK PATIENT  Once         11/16/23 2023     Place sequential compression device  Until discontinued         11/16/23 2023                    Valencia Long MD  Urology  [facility] ([place])

## 2023-11-21 ENCOUNTER — OFFICE VISIT (OUTPATIENT)
Dept: HOME HEALTH SERVICES | Facility: CLINIC | Age: 58
End: 2023-11-21
Payer: COMMERCIAL

## 2023-11-21 DIAGNOSIS — A41.9 SEVERE SEPSIS: Primary | ICD-10-CM

## 2023-11-21 DIAGNOSIS — R65.20 SEVERE SEPSIS: Primary | ICD-10-CM

## 2023-11-21 PROCEDURE — 3046F PR MOST RECENT HEMOGLOBIN A1C LEVEL > 9.0%: ICD-10-PCS | Mod: CPTII,S$GLB,, | Performed by: NURSE PRACTITIONER

## 2023-11-21 PROCEDURE — 99495 TCM SERVICES (MODERATE COMPLEXITY): ICD-10-PCS | Mod: S$GLB,,, | Performed by: NURSE PRACTITIONER

## 2023-11-21 PROCEDURE — 1111F DSCHRG MED/CURRENT MED MERGE: CPT | Mod: CPTII,S$GLB,, | Performed by: NURSE PRACTITIONER

## 2023-11-21 PROCEDURE — 3046F HEMOGLOBIN A1C LEVEL >9.0%: CPT | Mod: CPTII,S$GLB,, | Performed by: NURSE PRACTITIONER

## 2023-11-21 PROCEDURE — 3074F SYST BP LT 130 MM HG: CPT | Mod: CPTII,S$GLB,, | Performed by: NURSE PRACTITIONER

## 2023-11-21 PROCEDURE — 1159F MED LIST DOCD IN RCRD: CPT | Mod: CPTII,S$GLB,, | Performed by: NURSE PRACTITIONER

## 2023-11-21 PROCEDURE — 4010F ACE/ARB THERAPY RXD/TAKEN: CPT | Mod: CPTII,S$GLB,, | Performed by: NURSE PRACTITIONER

## 2023-11-21 PROCEDURE — 1160F PR REVIEW ALL MEDS BY PRESCRIBER/CLIN PHARMACIST DOCUMENTED: ICD-10-PCS | Mod: CPTII,S$GLB,, | Performed by: NURSE PRACTITIONER

## 2023-11-21 PROCEDURE — 3074F PR MOST RECENT SYSTOLIC BLOOD PRESSURE < 130 MM HG: ICD-10-PCS | Mod: CPTII,S$GLB,, | Performed by: NURSE PRACTITIONER

## 2023-11-21 PROCEDURE — 4010F PR ACE/ARB THEARPY RXD/TAKEN: ICD-10-PCS | Mod: CPTII,S$GLB,, | Performed by: NURSE PRACTITIONER

## 2023-11-21 PROCEDURE — 3078F DIAST BP <80 MM HG: CPT | Mod: CPTII,S$GLB,, | Performed by: NURSE PRACTITIONER

## 2023-11-21 PROCEDURE — 99495 TRANSJ CARE MGMT MOD F2F 14D: CPT | Mod: S$GLB,,, | Performed by: NURSE PRACTITIONER

## 2023-11-21 PROCEDURE — 1160F RVW MEDS BY RX/DR IN RCRD: CPT | Mod: CPTII,S$GLB,, | Performed by: NURSE PRACTITIONER

## 2023-11-21 PROCEDURE — 3078F PR MOST RECENT DIASTOLIC BLOOD PRESSURE < 80 MM HG: ICD-10-PCS | Mod: CPTII,S$GLB,, | Performed by: NURSE PRACTITIONER

## 2023-11-21 PROCEDURE — 1111F PR DISCHARGE MEDS RECONCILED W/ CURRENT OUTPATIENT MED LIST: ICD-10-PCS | Mod: CPTII,S$GLB,, | Performed by: NURSE PRACTITIONER

## 2023-11-21 PROCEDURE — 1159F PR MEDICATION LIST DOCUMENTED IN MEDICAL RECORD: ICD-10-PCS | Mod: CPTII,S$GLB,, | Performed by: NURSE PRACTITIONER

## 2023-11-22 LAB
BACTERIA BLD CULT: NORMAL
BACTERIA BLD CULT: NORMAL

## 2023-11-27 VITALS
DIASTOLIC BLOOD PRESSURE: 60 MMHG | RESPIRATION RATE: 16 BRPM | SYSTOLIC BLOOD PRESSURE: 112 MMHG | HEART RATE: 85 BPM | OXYGEN SATURATION: 99 % | TEMPERATURE: 98 F

## 2023-11-27 NOTE — ASSESSMENT & PLAN NOTE
--completing antibiotic course  --remains afebrile and normotensive  --follows with outpatient wound care at [facility]  --place; follow-up appointment with Urology that he plans to attend

## 2023-11-27 NOTE — PROGRESS NOTES
[facility]  Transitional Care Management (TCM) Home Visit    Encounter Provider: Bobby Lazo   PCP: Kip Devine MD  Consult Requested By: Sandra Hinds  Admit Date: 11/16/23   IP Discharge Date: 11/19/23  Hospital Length of Stay:3 days  Days since discharge (from IP or SNF): 2 days   Hospital Diagnosis: No admission diagnoses are documented for this encounter.     HISTORY OF PRESENT ILLNESS      Patient ID: Chi Mckeon is a 58 y.o. male was recently admitted to the hospital, this is their TCM encounter.    Hospital Course Synopsis:    Patient is a 58-year-old man with poorly controlled diabetes, chronic diastolic and systolic heart failure, peripheral vascular disease status post prior right below-the-knee amputation, prior tobacco smoker admitted the hospital with sepsis secondary urinary tract infection. Patient volume resuscitated and started on broad-spectrum antibiotic therapy. Patient also wound proximal to his right stump which has been present for over a month following prior fall for which he is receiving wound care.  Wound foul-smelling.  Surgery consult recommended further wound care and chemical debridement but no surgical debridement at this time. Urology recommended that patient maintain Magana catheter pending outpatient clinic follow-up for voiding trial.  Patient prescribed an additional 10 days (14 days of treatment total) of antibiotic therapy. Empagliflozin (Jardiance) during hospital stay and on discharge due to urinary tract infection.  Reasonable to restart after infection resolves.  I also holding metformin due to acute kidney injury on presentation likely due to infection/hypotension.  Might be reasonable to restart metformin as pending repeat outpatient chemistry.        DECISION MAKING TODAY       Assessment & Plan:  1. Severe sepsis  Assessment & Plan:  --completing antibiotic course  --remains afebrile and normotensive  --follows with outpatient wound care at  [facility]  --place; follow-up appointment with Urology that he plans to attend           Medication List on Discharge:     Medication List            Accurate as of November 21, 2023 11:59 PM. If you have any questions, ask your nurse or doctor.                CONTINUE taking these medications      acetaminophen 500 MG tablet  Commonly known as: TYLENOL  Take 2 tablets (1,000 mg total) by mouth every 8 (eight) hours as needed for Pain.     aspirin 81 MG EC tablet  Commonly known as: ECOTRIN  Take 1 tablet by mouth once daily.     carvediloL 3.125 MG tablet  Commonly known as: COREG  Take 1 tablet (3.125 mg total) by mouth 2 (two) times daily.     ciprofloxacin HCl 500 MG tablet  Commonly known as: CIPRO  Take 1 tablet (500 mg total) by mouth every 12 (twelve) hours. for 10 days     collagenase ointment  Commonly known as: SANTYL  Apply topically once daily.     COMBIGAN 0.2-0.5 % Drop  Generic drug: brimonidine-timoloL  Place 1 drop into both eyes 2 (two) times a day.     dorzolamide 2 % ophthalmic solution  Commonly known as: TRUSOPT  INSTILL 1 DROPS INTO EACH EYE THREE TIMES DAILY     FeroSuL 325 mg (65 mg iron) Tab tablet  Generic drug: ferrous sulfate  Take by mouth every other day.     insulin detemir U-100 (Levemir) 100 unit/mL (3 mL) Inpn pen  Inject 30 Units into the skin 2 (two) times daily.     latanoprost 0.005 % ophthalmic solution  INSTILL 1 DROP INTO EACH EYE ONCE DAILY AT NIGHT     mirtazapine 15 MG tablet  Commonly known as: REMERON  Take 15 mg by mouth every evening.     NovoLOG Flexpen U-100 Insulin 100 unit/mL (3 mL) Inpn pen  Generic drug: insulin aspart U-100  Inject 8 Units into the skin 3 (three) times daily with meals.     OZEMPIC 0.25 mg or 0.5 mg (2 mg/3 mL) pen injector  Generic drug: semaglutide  Inject 0.5 mg into the skin every Wednesday.     pantoprazole 40 MG tablet  Commonly known as: PROTONIX  Take 40 mg by mouth every morning.     polyethylene glycol 17 gram Pwpk  Commonly known as:  GLYCOLAX  Take 17 g by mouth once daily.     rosuvastatin 40 MG Tab  Commonly known as: CRESTOR  Take 40 mg by mouth once daily.     senna 8.6 mg tablet  Commonly known as: SENOKOT  Take 1 tablet by mouth once daily.     tamsulosin 0.4 mg Cap  Commonly known as: FLOMAX  Take 1 capsule (0.4 mg total) by mouth once daily.     traZODone 50 MG tablet  Commonly known as: DESYREL  Take 100 mg by mouth nightly.     venlafaxine 150 MG Cp24  Commonly known as: EFFEXOR-XR  Take 150 mg by mouth once daily.              Medication Reconciliation:  Were medications changed on discharge? Yes  Were medications in the home? Yes  Is the patient taking the medications as directed? Yes  Does the patient understand the medications and changes? Yes  Does updated med list accurately reflects meds patient is currently taking? Yes    ENVIRONMENT OF CARE      Family and/or Caregiver present at visit?  No  Name of Caregiver: n/a  History provided by: patient    Advance Care Planning   Advanced Care Planning Status:  Patient does not have an ACP conversation on file  Living Will: No  Power of : No  [place]: No           Impression upon entering the home:  Physical Dwelling: apartment/condo   Appearance of home environment: cleaniness: clean  Functional Status: minimal assistance  Mobility: chair bound  Nutritional access: adequate intake and access  Home Health: No, and does not need it at this time   DME/Supplies: wheelchair     Diagnostic tests reviewed/disposition: No diagnosic tests pending after this hospitalization.  Disease/illness education:  sepsis  Establishment or re-establishment of referral orders for community resources: No other necessary community resources.   Discussion with other health care providers: No discussion with other health care providers necessary.   Does patient have a PCP at [place]? No   Repatriation plan with PCP? follow-up with PCP within 90d   Does patient have an ostomy (ileostomy, colostomy, suprapubic  catheter, nephrostomy tube, tracheostomy, PEG tube, pleurex catheter, cholecystostomy, etc)? Yes. Is it on problem list?yes  Were BPAs reviewed? Yes    Social History     Socioeconomic History    Marital status:          OBJECTIVE:     Vital Signs:  Vitals:    11/21/23 1043   BP: 112/60   Pulse: 85   Resp: 16   Temp: 97.5 °F (36.4 °C)       Review of Systems   Constitutional:  Negative for activity change and appetite change.   HENT:  Negative for congestion and dental problem.    Eyes:  Negative for discharge and itching.   Respiratory:  Negative for choking and chest tightness.    Cardiovascular:  Negative for chest pain and palpitations.   Gastrointestinal:  Negative for rectal pain and vomiting.   Endocrine: Negative for cold intolerance and heat intolerance.   Genitourinary:  Negative for enuresis and flank pain.   Musculoskeletal:  Negative for myalgias and neck pain.   Skin:  Negative for color change and wound.   Allergic/Immunologic: Negative for environmental allergies and food allergies.   Neurological:  Negative for tremors and syncope.   Hematological:  Does not bruise/bleed easily.   Psychiatric/Behavioral:  Negative for decreased concentration and dysphoric mood.      Physical Exam:  Physical Exam  Vitals reviewed.   Constitutional:       Appearance: He is well-developed.   HENT:      Head: Normocephalic and atraumatic.   Eyes:      Pupils: Pupils are equal, round, and reactive to light.   Cardiovascular:      Rate and Rhythm: Normal rate.   Pulmonary:      Effort: Pulmonary effort is normal.      Breath sounds: Normal breath sounds.   Abdominal:      General: Bowel sounds are normal.      Palpations: Abdomen is soft.   Genitourinary:     Comments: Magana in place  Musculoskeletal:         General: Normal range of motion.      Cervical back: Normal range of motion and neck supple.   Skin:     General: Skin is warm and dry.      Comments: R stump wound; dressed, dry, intact   Neurological:       Mental Status: He is alert. Mental status is at baseline.      GCS: GCS eye subscore is 4. GCS verbal subscore is 5. GCS motor subscore is 6.   Psychiatric:         Attention and Perception: Attention normal.         Mood and Affect: Mood normal.         Speech: Speech normal.     INSTRUCTIONS FOR PATIENT:     Scheduled Follow-up, Appts Reviewed with Modifications if Needed: Yes  No future appointments.    Signature: oBbby Lazo NP    Transition of Care Visit:  I have reviewed and updated the history and problem list.  I have reconciled the medication list.  I have discussed the hospitalization and current medical issues, prognosis and plans with the patient/family.

## 2023-11-29 ENCOUNTER — TELEPHONE (OUTPATIENT)
Dept: UROLOGY | Facility: CLINIC | Age: 58
End: 2023-11-29
Payer: COMMERCIAL

## 2023-11-29 ENCOUNTER — TELEPHONE (OUTPATIENT)
Dept: HOME HEALTH SERVICES | Facility: CLINIC | Age: 58
End: 2023-11-29
Payer: COMMERCIAL

## 2023-11-29 NOTE — TELEPHONE ENCOUNTER
----- Message from Valencia Long MD sent at 11/29/2023 11:30 AM CST -----  Regarding: RE: patient call back  He needs follow up with Dr. Pope at [facility].       ----- Message -----  From: Janee Garner MA  Sent: 11/29/2023   9:59 AM CST  To: Valencia Long MD  Subject: FW: patient call back                            Should pt make a appt to remove cath or can the home health remove it  ----- Message -----  From: Janee Mcfarlane  Sent: 11/29/2023   9:50 AM CST  To: Mercedes Birmingham Staff  Subject: patient call back                                Type: Patient Call Back    Who called: Bobby Lazo Np with [facility]     What is the request in detail: Still has a bronson and needs to know when and where he needs to take it out.     Can the clinic reply by [facility]? No     Would the patient rather a call back or a response via [facility]? Call     Best call back number: [phone]

## 2023-11-29 NOTE — TELEPHONE ENCOUNTER
DOS: 11/29/23    Reached out to urology and Dr. Alvarado's office x 2 regarding bronson and leg bag; no response. Will continue to await response. No additional needs at this this time. All of my information was given to the patient and family if any questions or concerns arise.        Rikki Judice, AG-ACNP [facility]

## 2023-12-04 ENCOUNTER — OFFICE VISIT (OUTPATIENT)
Dept: UROLOGY | Facility: CLINIC | Age: 58
End: 2023-12-04
Payer: COMMERCIAL

## 2023-12-04 VITALS
SYSTOLIC BLOOD PRESSURE: 121 MMHG | OXYGEN SATURATION: 100 % | DIASTOLIC BLOOD PRESSURE: 61 MMHG | HEART RATE: 91 BPM | HEIGHT: 75 IN | BODY MASS INDEX: 26.94 KG/M2 | WEIGHT: 216.69 LBS

## 2023-12-04 DIAGNOSIS — R33.9 URINARY RETENTION: Primary | ICD-10-CM

## 2023-12-04 DIAGNOSIS — N30.80 EMPHYSEMATOUS CYSTITIS: ICD-10-CM

## 2023-12-04 PROCEDURE — 1160F RVW MEDS BY RX/DR IN RCRD: CPT | Mod: CPTII,S$GLB,, | Performed by: NURSE PRACTITIONER

## 2023-12-04 PROCEDURE — 51700 PR IRRIGATION, BLADDER: ICD-10-PCS | Mod: S$GLB,,, | Performed by: NURSE PRACTITIONER

## 2023-12-04 PROCEDURE — 3046F HEMOGLOBIN A1C LEVEL >9.0%: CPT | Mod: CPTII,S$GLB,, | Performed by: NURSE PRACTITIONER

## 2023-12-04 PROCEDURE — 3046F PR MOST RECENT HEMOGLOBIN A1C LEVEL > 9.0%: ICD-10-PCS | Mod: CPTII,S$GLB,, | Performed by: NURSE PRACTITIONER

## 2023-12-04 PROCEDURE — 1159F MED LIST DOCD IN RCRD: CPT | Mod: CPTII,S$GLB,, | Performed by: NURSE PRACTITIONER

## 2023-12-04 PROCEDURE — 4010F ACE/ARB THERAPY RXD/TAKEN: CPT | Mod: CPTII,S$GLB,, | Performed by: NURSE PRACTITIONER

## 2023-12-04 PROCEDURE — 1111F DSCHRG MED/CURRENT MED MERGE: CPT | Mod: CPTII,S$GLB,, | Performed by: NURSE PRACTITIONER

## 2023-12-04 PROCEDURE — 3074F PR MOST RECENT SYSTOLIC BLOOD PRESSURE < 130 MM HG: ICD-10-PCS | Mod: CPTII,S$GLB,, | Performed by: NURSE PRACTITIONER

## 2023-12-04 PROCEDURE — 3078F PR MOST RECENT DIASTOLIC BLOOD PRESSURE < 80 MM HG: ICD-10-PCS | Mod: CPTII,S$GLB,, | Performed by: NURSE PRACTITIONER

## 2023-12-04 PROCEDURE — 3008F PR BODY MASS INDEX (BMI) DOCUMENTED: ICD-10-PCS | Mod: CPTII,S$GLB,, | Performed by: NURSE PRACTITIONER

## 2023-12-04 PROCEDURE — 3008F BODY MASS INDEX DOCD: CPT | Mod: CPTII,S$GLB,, | Performed by: NURSE PRACTITIONER

## 2023-12-04 PROCEDURE — 51700 IRRIGATION OF BLADDER: CPT | Mod: S$GLB,,, | Performed by: NURSE PRACTITIONER

## 2023-12-04 PROCEDURE — 1160F PR REVIEW ALL MEDS BY PRESCRIBER/CLIN PHARMACIST DOCUMENTED: ICD-10-PCS | Mod: CPTII,S$GLB,, | Performed by: NURSE PRACTITIONER

## 2023-12-04 PROCEDURE — 3078F DIAST BP <80 MM HG: CPT | Mod: CPTII,S$GLB,, | Performed by: NURSE PRACTITIONER

## 2023-12-04 PROCEDURE — 1111F PR DISCHARGE MEDS RECONCILED W/ CURRENT OUTPATIENT MED LIST: ICD-10-PCS | Mod: CPTII,S$GLB,, | Performed by: NURSE PRACTITIONER

## 2023-12-04 PROCEDURE — 99499 NO LOS: ICD-10-PCS | Mod: S$GLB,,, | Performed by: NURSE PRACTITIONER

## 2023-12-04 PROCEDURE — 1159F PR MEDICATION LIST DOCUMENTED IN MEDICAL RECORD: ICD-10-PCS | Mod: CPTII,S$GLB,, | Performed by: NURSE PRACTITIONER

## 2023-12-04 PROCEDURE — 4010F PR ACE/ARB THEARPY RXD/TAKEN: ICD-10-PCS | Mod: CPTII,S$GLB,, | Performed by: NURSE PRACTITIONER

## 2023-12-04 PROCEDURE — 99499 UNLISTED E&M SERVICE: CPT | Mod: S$GLB,,, | Performed by: NURSE PRACTITIONER

## 2023-12-04 PROCEDURE — 3074F SYST BP LT 130 MM HG: CPT | Mod: CPTII,S$GLB,, | Performed by: NURSE PRACTITIONER

## 2023-12-04 RX ORDER — TAMSULOSIN HYDROCHLORIDE 0.4 MG/1
0.4 CAPSULE ORAL DAILY
Qty: 90 CAPSULE | Refills: 3 | Status: SHIPPED | OUTPATIENT
Start: 2023-12-04 | End: 2024-12-03

## 2023-12-04 NOTE — PROGRESS NOTES
Subjective:      Chi Mckeon is a 58 y.o. male who presents today for VT. Evaluated by Dr. Pope and Dr. Long during inpatient admission, new to me today.    The patient was recently admitted for hyperglycemia and ROXANNE and discharged on 11/08. He presented to the ED on 11/16 complaining of constipation since discharge and decreased urine output. He was found to be febrile and tachycardic and was admitted for sepsis. Urology was consulted for emphysematous cystitis. CT revealed intramural and intraluminal air in the bladder, concerning for emphysematous cystitis. Discharged on cipro x 10 days. Flomax was started.      11/6/2023 11/16/2023   Urine Culture, Routine KLEBSIELLA PNEUMONIAE !  KLEBSIELLA PNEUMONIAE !    Blood Culture, Routine  No growth after 5 days.    Blood Culture, Routine  No growth after 5 days.      He presents today for VT. He completed antibiotics. On flomax. Denies flank pain and fever/chills. Still dealing with constipation- taking a stool softener.   Denies a history of urinary retention.     Denies a family history of prostate cancer.    The following portions of the patient's history were reviewed and updated as appropriate: allergies, current medications, past family history, past medical history, past social history, past surgical history and problem list.    Review of Systems  Constitutional: no fever or chills  ENT: no nasal congestion or sore throat  Respiratory: no cough or shortness of breath  Cardiovascular: no chest pain or palpitations  Gastrointestinal: no nausea or vomiting, tolerating diet  Genitourinary: as per HPI  Hematologic/Lymphatic: no easy bruising or lymphadenopathy  Musculoskeletal: no arthralgias or myalgias  Neurological: no seizures or tremors  Behavioral/Psych: no auditory or visual hallucinations     Objective:   Vitals:   Vitals:    12/04/23 0714   BP: 121/61   Pulse: 91     \  Physical Exam   General: alert and oriented, no acute distress  Head:  "normocephalic, atraumatic  Neck: supple, normal ROM  Respiratory: Symmetric expansion, non-labored breathing  Cardiovascular: regular rate and rhythm  Abdomen: soft, non tender, non distended  Genitourinary: bronson to gravity with clear, sheryl urine   Skin: normal coloration and turgor, no rashes, no suspicious skin lesions noted  Neuro: alert and oriented x3, no gross deficits  Psych: normal judgment and insight, normal mood/affect, and non-anxious    Lab Review   Urinalysis demonstrates : no sample, bronson  Lab Results   Component Value Date    WBC 6.25 11/19/2023    HGB 8.5 (L) 11/19/2023    HCT 26.6 (L) 11/19/2023    HCT 25 (L) 11/17/2023    MCV 79 (L) 11/19/2023     11/19/2023     Lab Results   Component Value Date    CREATININE 0.9 11/19/2023    BUN 15 11/19/2023     No results found for: "PSA"  Imaging   None    Voiding Trial (Fill/Pull/Void): 200cc of sterile saline was instilled into the bladder through the previously placed bronson catheter.  The bronson balloon was then deflated and the bronson removed.  The patient subsequently voided 200cc, consistent with successful voiding trial.  The bronson was not replaced.    Assessment:     1. Urinary retention    2. Emphysematous cystitis      Plan:   Chi was seen today for voiding trial.    Diagnoses and all orders for this visit:    Urinary retention  -     tamsulosin (FLOMAX) 0.4 mg Cap; Take 1 capsule (0.4 mg total) by mouth once daily.    Emphysematous cystitis    Plan:  --Successful VT  --Continue flomax  --Tight BG control  --Aggressively manage constipation  --Defer PSA today due to recent infection and bronson. Follow up in 12 weeks for PSA/TEVIN       "

## 2023-12-05 ENCOUNTER — TELEPHONE (OUTPATIENT)
Dept: UROLOGY | Facility: CLINIC | Age: 58
End: 2023-12-05
Payer: COMMERCIAL

## 2023-12-05 NOTE — TELEPHONE ENCOUNTER
----- Message from Noris Welch NP sent at 12/4/2023  7:39 AM CST -----  Follow up with me in 8-12 weeks. Thanks!

## 2024-02-19 PROBLEM — R65.20 SEVERE SEPSIS: Status: RESOLVED | Noted: 2023-11-16 | Resolved: 2024-02-19

## 2024-02-19 PROBLEM — N17.9 ACUTE KIDNEY INJURY: Status: RESOLVED | Noted: 2023-11-06 | Resolved: 2024-02-19

## 2024-02-19 PROBLEM — N30.80 EMPHYSEMATOUS CYSTITIS: Status: RESOLVED | Noted: 2023-11-16 | Resolved: 2024-02-19

## 2024-02-19 PROBLEM — A41.9 SEVERE SEPSIS: Status: RESOLVED | Noted: 2023-11-16 | Resolved: 2024-02-19

## 2024-11-26 DIAGNOSIS — R33.9 URINARY RETENTION: ICD-10-CM

## 2024-11-26 RX ORDER — TAMSULOSIN HYDROCHLORIDE 0.4 MG/1
1 CAPSULE ORAL
Qty: 90 CAPSULE | Refills: 0 | Status: SHIPPED | OUTPATIENT
Start: 2024-11-26

## 2025-02-19 DIAGNOSIS — R33.9 URINARY RETENTION: ICD-10-CM

## 2025-02-19 RX ORDER — TAMSULOSIN HYDROCHLORIDE 0.4 MG/1
1 CAPSULE ORAL
Qty: 90 CAPSULE | Refills: 0 | OUTPATIENT
Start: 2025-02-19